# Patient Record
Sex: MALE | Race: WHITE | NOT HISPANIC OR LATINO | Employment: OTHER | URBAN - METROPOLITAN AREA
[De-identification: names, ages, dates, MRNs, and addresses within clinical notes are randomized per-mention and may not be internally consistent; named-entity substitution may affect disease eponyms.]

---

## 2021-02-28 ENCOUNTER — IMMUNIZATIONS (OUTPATIENT)
Dept: FAMILY MEDICINE CLINIC | Facility: HOSPITAL | Age: 81
End: 2021-02-28

## 2021-02-28 DIAGNOSIS — Z23 ENCOUNTER FOR IMMUNIZATION: Primary | ICD-10-CM

## 2021-02-28 PROCEDURE — 0001A SARS-COV-2 / COVID-19 MRNA VACCINE (PFIZER-BIONTECH) 30 MCG: CPT

## 2021-02-28 PROCEDURE — 91300 SARS-COV-2 / COVID-19 MRNA VACCINE (PFIZER-BIONTECH) 30 MCG: CPT

## 2021-03-21 ENCOUNTER — IMMUNIZATIONS (OUTPATIENT)
Dept: FAMILY MEDICINE CLINIC | Facility: HOSPITAL | Age: 81
End: 2021-03-21

## 2021-03-21 DIAGNOSIS — Z23 ENCOUNTER FOR IMMUNIZATION: Primary | ICD-10-CM

## 2021-03-21 PROCEDURE — 91300 SARS-COV-2 / COVID-19 MRNA VACCINE (PFIZER-BIONTECH) 30 MCG: CPT

## 2021-03-21 PROCEDURE — 0002A SARS-COV-2 / COVID-19 MRNA VACCINE (PFIZER-BIONTECH) 30 MCG: CPT

## 2021-09-03 ENCOUNTER — HOSPITAL ENCOUNTER (INPATIENT)
Facility: HOSPITAL | Age: 81
LOS: 5 days | Discharge: HOME HEALTH | DRG: 189 | End: 2021-09-08
Attending: EMERGENCY MEDICINE | Admitting: INTERNAL MEDICINE
Payer: MEDICARE

## 2021-09-03 ENCOUNTER — APPOINTMENT (EMERGENCY)
Dept: CT IMAGING | Facility: HOSPITAL | Age: 81
DRG: 189 | End: 2021-09-03
Payer: MEDICARE

## 2021-09-03 ENCOUNTER — APPOINTMENT (EMERGENCY)
Dept: RADIOLOGY | Facility: HOSPITAL | Age: 81
DRG: 189 | End: 2021-09-03
Payer: MEDICARE

## 2021-09-03 DIAGNOSIS — F03.90 DEMENTIA (HCC): ICD-10-CM

## 2021-09-03 DIAGNOSIS — I71.9 AORTIC ANEURYSM (HCC): Primary | ICD-10-CM

## 2021-09-03 DIAGNOSIS — I48.0 PAROXYSMAL ATRIAL FIBRILLATION (HCC): ICD-10-CM

## 2021-09-03 DIAGNOSIS — T17.908A ASPIRATION INTO AIRWAY: ICD-10-CM

## 2021-09-03 DIAGNOSIS — R53.1 WEAKNESS: ICD-10-CM

## 2021-09-03 PROBLEM — Z95.1 HX OF CABG: Status: ACTIVE | Noted: 2021-09-03

## 2021-09-03 PROBLEM — I48.91 ATRIAL FIBRILLATION (HCC): Status: ACTIVE | Noted: 2021-09-03

## 2021-09-03 PROBLEM — E78.5 HYPERLIPIDEMIA: Status: ACTIVE | Noted: 2021-09-03

## 2021-09-03 PROBLEM — J96.00 ACUTE RESPIRATORY FAILURE (HCC): Status: ACTIVE | Noted: 2021-09-03

## 2021-09-03 PROBLEM — F02.80 ALZHEIMER'S DEMENTIA (HCC): Status: ACTIVE | Noted: 2021-09-03

## 2021-09-03 PROBLEM — E03.9 HYPOTHYROIDISM: Status: ACTIVE | Noted: 2021-09-03

## 2021-09-03 PROBLEM — R06.02 SHORTNESS OF BREATH: Status: ACTIVE | Noted: 2021-09-03

## 2021-09-03 PROBLEM — G30.9 ALZHEIMER'S DEMENTIA (HCC): Status: ACTIVE | Noted: 2021-09-03

## 2021-09-03 PROBLEM — I71.4 AAA (ABDOMINAL AORTIC ANEURYSM) (HCC): Status: ACTIVE | Noted: 2021-09-03

## 2021-09-03 LAB
ALBUMIN SERPL BCP-MCNC: 2.5 G/DL (ref 3.5–5)
ALP SERPL-CCNC: 42 U/L (ref 46–116)
ALT SERPL W P-5'-P-CCNC: 19 U/L (ref 12–78)
ANION GAP SERPL CALCULATED.3IONS-SCNC: 7 MMOL/L (ref 4–13)
APTT PPP: 49 SECONDS (ref 23–37)
AST SERPL W P-5'-P-CCNC: 15 U/L (ref 5–45)
ATRIAL RATE: 88 BPM
BACTERIA UR QL AUTO: NORMAL /HPF
BASOPHILS # BLD AUTO: 0.03 THOUSANDS/ΜL (ref 0–0.1)
BASOPHILS NFR BLD AUTO: 0 % (ref 0–1)
BILIRUB SERPL-MCNC: 0.8 MG/DL (ref 0.2–1)
BILIRUB UR QL STRIP: NEGATIVE
BUN SERPL-MCNC: 15 MG/DL (ref 5–25)
CALCIUM ALBUM COR SERPL-MCNC: 9.1 MG/DL (ref 8.3–10.1)
CALCIUM SERPL-MCNC: 7.9 MG/DL (ref 8.3–10.1)
CHLORIDE SERPL-SCNC: 101 MMOL/L (ref 100–108)
CLARITY UR: CLEAR
CO2 SERPL-SCNC: 30 MMOL/L (ref 21–32)
COLOR UR: YELLOW
CREAT SERPL-MCNC: 1.1 MG/DL (ref 0.6–1.3)
EOSINOPHIL # BLD AUTO: 0.04 THOUSAND/ΜL (ref 0–0.61)
EOSINOPHIL NFR BLD AUTO: 0 % (ref 0–6)
ERYTHROCYTE [DISTWIDTH] IN BLOOD BY AUTOMATED COUNT: 13.6 % (ref 11.6–15.1)
GFR SERPL CREATININE-BSD FRML MDRD: 63 ML/MIN/1.73SQ M
GLUCOSE SERPL-MCNC: 107 MG/DL (ref 65–140)
GLUCOSE UR STRIP-MCNC: NEGATIVE MG/DL
HCT VFR BLD AUTO: 51.2 % (ref 36.5–49.3)
HGB BLD-MCNC: 17.2 G/DL (ref 12–17)
HGB UR QL STRIP.AUTO: ABNORMAL
IMM GRANULOCYTES # BLD AUTO: 0.05 THOUSAND/UL (ref 0–0.2)
IMM GRANULOCYTES NFR BLD AUTO: 1 % (ref 0–2)
INR PPP: 2.32 (ref 0.84–1.19)
KETONES UR STRIP-MCNC: NEGATIVE MG/DL
LACTATE SERPL-SCNC: 1.6 MMOL/L (ref 0.5–2)
LEUKOCYTE ESTERASE UR QL STRIP: NEGATIVE
LYMPHOCYTES # BLD AUTO: 0.77 THOUSANDS/ΜL (ref 0.6–4.47)
LYMPHOCYTES NFR BLD AUTO: 8 % (ref 14–44)
MCH RBC QN AUTO: 33.9 PG (ref 26.8–34.3)
MCHC RBC AUTO-ENTMCNC: 33.6 G/DL (ref 31.4–37.4)
MCV RBC AUTO: 101 FL (ref 82–98)
MONOCYTES # BLD AUTO: 1.28 THOUSAND/ΜL (ref 0.17–1.22)
MONOCYTES NFR BLD AUTO: 13 % (ref 4–12)
MUCOUS THREADS UR QL AUTO: NORMAL
NEUTROPHILS # BLD AUTO: 7.53 THOUSANDS/ΜL (ref 1.85–7.62)
NEUTS SEG NFR BLD AUTO: 78 % (ref 43–75)
NITRITE UR QL STRIP: NEGATIVE
NON-SQ EPI CELLS URNS QL MICRO: NORMAL /HPF
NRBC BLD AUTO-RTO: 0 /100 WBCS
NT-PROBNP SERPL-MCNC: 909 PG/ML
P AXIS: 34 DEGREES
PH UR STRIP.AUTO: 7 [PH]
PLATELET # BLD AUTO: 146 THOUSANDS/UL (ref 149–390)
PMV BLD AUTO: 9.3 FL (ref 8.9–12.7)
POTASSIUM SERPL-SCNC: 3.2 MMOL/L (ref 3.5–5.3)
PR INTERVAL: 126 MS
PROCALCITONIN SERPL-MCNC: 0.06 NG/ML
PROT SERPL-MCNC: 6.1 G/DL (ref 6.4–8.2)
PROT UR STRIP-MCNC: NEGATIVE MG/DL
PROTHROMBIN TIME: 25.3 SECONDS (ref 11.6–14.5)
QRS AXIS: 62 DEGREES
QRSD INTERVAL: 106 MS
QT INTERVAL: 380 MS
QTC INTERVAL: 459 MS
RBC # BLD AUTO: 5.08 MILLION/UL (ref 3.88–5.62)
RBC #/AREA URNS AUTO: NORMAL /HPF
SARS-COV-2 RNA RESP QL NAA+PROBE: NEGATIVE
SODIUM SERPL-SCNC: 138 MMOL/L (ref 136–145)
SP GR UR STRIP.AUTO: <=1.005 (ref 1–1.03)
T WAVE AXIS: 254 DEGREES
TROPONIN I SERPL-MCNC: <0.02 NG/ML
UROBILINOGEN UR QL STRIP.AUTO: 1 E.U./DL
VENTRICULAR RATE: 88 BPM
WBC # BLD AUTO: 9.7 THOUSAND/UL (ref 4.31–10.16)
WBC #/AREA URNS AUTO: NORMAL /HPF
WBC CLUMPS # UR AUTO: PRESENT /UL

## 2021-09-03 PROCEDURE — 99285 EMERGENCY DEPT VISIT HI MDM: CPT

## 2021-09-03 PROCEDURE — 93005 ELECTROCARDIOGRAM TRACING: CPT

## 2021-09-03 PROCEDURE — 36415 COLL VENOUS BLD VENIPUNCTURE: CPT | Performed by: PHYSICIAN ASSISTANT

## 2021-09-03 PROCEDURE — 84484 ASSAY OF TROPONIN QUANT: CPT | Performed by: PHYSICIAN ASSISTANT

## 2021-09-03 PROCEDURE — 99222 1ST HOSP IP/OBS MODERATE 55: CPT | Performed by: PHYSICIAN ASSISTANT

## 2021-09-03 PROCEDURE — 83605 ASSAY OF LACTIC ACID: CPT | Performed by: PHYSICIAN ASSISTANT

## 2021-09-03 PROCEDURE — 87040 BLOOD CULTURE FOR BACTERIA: CPT | Performed by: PHYSICIAN ASSISTANT

## 2021-09-03 PROCEDURE — 80053 COMPREHEN METABOLIC PANEL: CPT | Performed by: PHYSICIAN ASSISTANT

## 2021-09-03 PROCEDURE — 1124F ACP DISCUSS-NO DSCNMKR DOCD: CPT | Performed by: INTERNAL MEDICINE

## 2021-09-03 PROCEDURE — 99285 EMERGENCY DEPT VISIT HI MDM: CPT | Performed by: PHYSICIAN ASSISTANT

## 2021-09-03 PROCEDURE — U0003 INFECTIOUS AGENT DETECTION BY NUCLEIC ACID (DNA OR RNA); SEVERE ACUTE RESPIRATORY SYNDROME CORONAVIRUS 2 (SARS-COV-2) (CORONAVIRUS DISEASE [COVID-19]), AMPLIFIED PROBE TECHNIQUE, MAKING USE OF HIGH THROUGHPUT TECHNOLOGIES AS DESCRIBED BY CMS-2020-01-R: HCPCS | Performed by: PHYSICIAN ASSISTANT

## 2021-09-03 PROCEDURE — 85610 PROTHROMBIN TIME: CPT | Performed by: PHYSICIAN ASSISTANT

## 2021-09-03 PROCEDURE — 71045 X-RAY EXAM CHEST 1 VIEW: CPT

## 2021-09-03 PROCEDURE — 83880 ASSAY OF NATRIURETIC PEPTIDE: CPT | Performed by: PHYSICIAN ASSISTANT

## 2021-09-03 PROCEDURE — 85025 COMPLETE CBC W/AUTO DIFF WBC: CPT | Performed by: PHYSICIAN ASSISTANT

## 2021-09-03 PROCEDURE — 71275 CT ANGIOGRAPHY CHEST: CPT

## 2021-09-03 PROCEDURE — 81001 URINALYSIS AUTO W/SCOPE: CPT | Performed by: PHYSICIAN ASSISTANT

## 2021-09-03 PROCEDURE — 93010 ELECTROCARDIOGRAM REPORT: CPT | Performed by: INTERNAL MEDICINE

## 2021-09-03 PROCEDURE — 94760 N-INVAS EAR/PLS OXIMETRY 1: CPT

## 2021-09-03 PROCEDURE — G1004 CDSM NDSC: HCPCS

## 2021-09-03 PROCEDURE — 84145 PROCALCITONIN (PCT): CPT | Performed by: PHYSICIAN ASSISTANT

## 2021-09-03 PROCEDURE — 99223 1ST HOSP IP/OBS HIGH 75: CPT | Performed by: PHYSICIAN ASSISTANT

## 2021-09-03 PROCEDURE — 94664 DEMO&/EVAL PT USE INHALER: CPT

## 2021-09-03 PROCEDURE — 85730 THROMBOPLASTIN TIME PARTIAL: CPT | Performed by: PHYSICIAN ASSISTANT

## 2021-09-03 PROCEDURE — 74174 CTA ABD&PLVS W/CONTRAST: CPT

## 2021-09-03 PROCEDURE — U0005 INFEC AGEN DETEC AMPLI PROBE: HCPCS | Performed by: PHYSICIAN ASSISTANT

## 2021-09-03 RX ORDER — CHOLECALCIFEROL (VITAMIN D3) 1250 MCG
1 CAPSULE ORAL WEEKLY
COMMUNITY

## 2021-09-03 RX ORDER — LEVOTHYROXINE SODIUM 0.03 MG/1
50 TABLET ORAL DAILY
Status: DISCONTINUED | OUTPATIENT
Start: 2021-09-04 | End: 2021-09-09 | Stop reason: HOSPADM

## 2021-09-03 RX ORDER — LEVOTHYROXINE SODIUM 0.05 MG/1
50 TABLET ORAL DAILY
COMMUNITY

## 2021-09-03 RX ORDER — POTASSIUM CHLORIDE 20MEQ/15ML
40 LIQUID (ML) ORAL ONCE
Status: COMPLETED | OUTPATIENT
Start: 2021-09-03 | End: 2021-09-03

## 2021-09-03 RX ORDER — ACETAMINOPHEN 160 MG/5ML
650 SUSPENSION, ORAL (FINAL DOSE FORM) ORAL ONCE
Status: COMPLETED | OUTPATIENT
Start: 2021-09-03 | End: 2021-09-03

## 2021-09-03 RX ORDER — GUAIFENESIN 600 MG
600 TABLET, EXTENDED RELEASE 12 HR ORAL 2 TIMES DAILY
Status: DISCONTINUED | OUTPATIENT
Start: 2021-09-03 | End: 2021-09-09 | Stop reason: HOSPADM

## 2021-09-03 RX ORDER — PHENOL 1.4 %
10 AEROSOL, SPRAY (ML) MUCOUS MEMBRANE
COMMUNITY

## 2021-09-03 RX ORDER — EZETIMIBE 10 MG/1
10 TABLET ORAL DAILY
Status: DISCONTINUED | OUTPATIENT
Start: 2021-09-04 | End: 2021-09-09 | Stop reason: HOSPADM

## 2021-09-03 RX ORDER — WARFARIN SODIUM 2.5 MG/1
2.5 TABLET ORAL
Status: DISCONTINUED | OUTPATIENT
Start: 2021-09-03 | End: 2021-09-06

## 2021-09-03 RX ORDER — POTASSIUM CHLORIDE 20 MEQ/1
40 TABLET, EXTENDED RELEASE ORAL ONCE
Status: DISCONTINUED | OUTPATIENT
Start: 2021-09-03 | End: 2021-09-03

## 2021-09-03 RX ORDER — DIVALPROEX SODIUM 250 MG/1
500 TABLET, DELAYED RELEASE ORAL
Status: DISCONTINUED | OUTPATIENT
Start: 2021-09-03 | End: 2021-09-09 | Stop reason: HOSPADM

## 2021-09-03 RX ORDER — OMEGA-3S/DHA/EPA/FISH OIL/D3 300MG-1000
400 CAPSULE ORAL DAILY
COMMUNITY
End: 2021-09-03 | Stop reason: CLARIF

## 2021-09-03 RX ORDER — PHENOL 1.4 %
10 AEROSOL, SPRAY (ML) MUCOUS MEMBRANE
Status: DISCONTINUED | OUTPATIENT
Start: 2021-09-03 | End: 2021-09-03

## 2021-09-03 RX ORDER — DIVALPROEX SODIUM 250 MG/1
250 TABLET, DELAYED RELEASE ORAL EVERY MORNING
Status: DISCONTINUED | OUTPATIENT
Start: 2021-09-04 | End: 2021-09-09 | Stop reason: HOSPADM

## 2021-09-03 RX ORDER — EZETIMIBE 10 MG/1
10 TABLET ORAL DAILY
COMMUNITY

## 2021-09-03 RX ORDER — SERTRALINE HYDROCHLORIDE 25 MG/1
25 TABLET, FILM COATED ORAL DAILY
Status: DISCONTINUED | OUTPATIENT
Start: 2021-09-04 | End: 2021-09-09 | Stop reason: HOSPADM

## 2021-09-03 RX ORDER — POTASSIUM CHLORIDE 20 MEQ/1
20 TABLET, EXTENDED RELEASE ORAL 2 TIMES DAILY
Status: DISCONTINUED | OUTPATIENT
Start: 2021-09-03 | End: 2021-09-07

## 2021-09-03 RX ORDER — SERTRALINE HYDROCHLORIDE 25 MG/1
25 TABLET, FILM COATED ORAL DAILY
COMMUNITY

## 2021-09-03 RX ORDER — ATENOLOL AND CHLORTHALIDONE TABLET 50; 25 MG/1; MG/1
1 TABLET ORAL DAILY
COMMUNITY
End: 2021-09-08 | Stop reason: HOSPADM

## 2021-09-03 RX ORDER — CEFTRIAXONE 1 G/50ML
1000 INJECTION, SOLUTION INTRAVENOUS EVERY 24 HOURS
Status: DISCONTINUED | OUTPATIENT
Start: 2021-09-03 | End: 2021-09-09 | Stop reason: HOSPADM

## 2021-09-03 RX ORDER — ALBUTEROL SULFATE 2.5 MG/3ML
2.5 SOLUTION RESPIRATORY (INHALATION) EVERY 4 HOURS PRN
Status: DISCONTINUED | OUTPATIENT
Start: 2021-09-03 | End: 2021-09-09 | Stop reason: HOSPADM

## 2021-09-03 RX ORDER — ACETAMINOPHEN 325 MG/1
650 TABLET ORAL EVERY 6 HOURS PRN
Status: DISCONTINUED | OUTPATIENT
Start: 2021-09-03 | End: 2021-09-09 | Stop reason: HOSPADM

## 2021-09-03 RX ORDER — WARFARIN SODIUM 5 MG/1
TABLET ORAL
COMMUNITY

## 2021-09-03 RX ORDER — DIVALPROEX SODIUM 250 MG/1
250 TABLET, DELAYED RELEASE ORAL EVERY 12 HOURS SCHEDULED
COMMUNITY

## 2021-09-03 RX ADMIN — IOHEXOL 100 ML: 350 INJECTION, SOLUTION INTRAVENOUS at 14:19

## 2021-09-03 RX ADMIN — Medication 10 MG: at 22:04

## 2021-09-03 RX ADMIN — DIVALPROEX SODIUM 500 MG: 250 TABLET, DELAYED RELEASE ORAL at 22:04

## 2021-09-03 RX ADMIN — POTASSIUM CHLORIDE 20 MEQ: 1500 TABLET, EXTENDED RELEASE ORAL at 18:04

## 2021-09-03 RX ADMIN — GUAIFENESIN 600 MG: 600 TABLET ORAL at 18:04

## 2021-09-03 RX ADMIN — ACETAMINOPHEN 650 MG: 325 TABLET, FILM COATED ORAL at 20:11

## 2021-09-03 RX ADMIN — ACETAMINOPHEN 650 MG: 650 SUSPENSION ORAL at 16:41

## 2021-09-03 RX ADMIN — CEFTRIAXONE 1000 MG: 1 INJECTION, SOLUTION INTRAVENOUS at 18:16

## 2021-09-03 RX ADMIN — POTASSIUM CHLORIDE 40 MEQ: 20 SOLUTION ORAL at 14:05

## 2021-09-03 RX ADMIN — WARFARIN SODIUM 2.5 MG: 2.5 TABLET ORAL at 18:04

## 2021-09-03 RX ADMIN — DRONEDARONE 400 MG: 400 TABLET, FILM COATED ORAL at 18:04

## 2021-09-03 NOTE — ASSESSMENT & PLAN NOTE
· Chads 2 Vasc score 2  · Maintained on warfarin 2 5 mg 5 days a week and 5 mg 2 days a week  · Continue Multaq, Tenoretic  · Rate stable in the 80s at time of admission  · Patient also with pacemaker

## 2021-09-03 NOTE — ASSESSMENT & PLAN NOTE
· X2, 1991 and 2001  · Follows with Dr Suad Regalado- Cardiologist  · Pacemaker in place  · Maintained on warfarin

## 2021-09-03 NOTE — ED PROVIDER NOTES
History  Chief Complaint   Patient presents with    Altered Mental Status     per wife pt has increased confusion, lethergy, sob, and weak, pt found by ems to be 85% room air  pt hxn of stroke with right sided deficets  pt has productive cough  Patient is an [de-identified] y/o M with h/o afib on Coumadin, pacer, alzheimer's that was brought in by ambulance for generalized weakness  He was found to be hypoxic on RA  Entire history obtained from wife  She states when she woke patient up this morning he was unable to stand  She states he seems very weak and confused today  He did start with a cough this morning  No fevers  No vomiting or diarrhea  Patient had a normal BM this morning  No blood in stool  History provided by:  Spouse and EMS personnel  History limited by:  Dementia  Altered Mental Status  Presenting symptoms: confusion    Presenting symptoms comment:  Weakness  Most recent episode: Today  Episode history:  Continuous  Timing:  Constant  Progression:  Unchanged  Context: dementia    Associated symptoms: weakness    Associated symptoms: no decreased appetite, no fever, no rash and no vomiting        Prior to Admission Medications   Prescriptions Last Dose Informant Patient Reported? Taking?    Dronedarone HCl (MULTAQ PO)   Yes Yes   Sig: Take by mouth   atenolol-chlorthalidone (TENORETIC) 50-25 mg per tablet   Yes Yes   Sig: Take 1 tablet by mouth daily   cholecalciferol (VITAMIN D3) 400 units tablet   Yes Yes   Sig: Take 400 Units by mouth daily   levothyroxine 50 mcg tablet   Yes Yes   Sig: Take 50 mcg by mouth daily   sertraline (ZOLOFT) 25 mg tablet   Yes Yes   Sig: Take 25 mg by mouth daily   warfarin (COUMADIN) 5 mg tablet   Yes Yes   Sig: Take by mouth daily      Facility-Administered Medications: None       Past Medical History:   Diagnosis Date    [facility]     Atrial fibrillation, chronic ([facility] )        Past Surgical History:   Procedure Laterality Date    APPENDECTOMY  CARDIAC PACEMAKER PLACEMENT      CORONARY ARTERY BYPASS GRAFT      HERNIA REPAIR         No family history on file  I have reviewed and agree with the history as documented  E-Cigarette/Vaping     E-Cigarette/Vaping Substances     Social History     Tobacco Use    Smoking status: Not on file   Substance Use Topics    Alcohol use: Not on file    Drug use: Not on file       Review of Systems   Unable to perform ROS: Dementia   Constitutional: Negative for appetite change, decreased appetite and fever  HENT: Negative for congestion  Respiratory: Positive for cough  Cardiovascular: Positive for leg swelling (chronic)  Gastrointestinal: Negative for blood in stool, diarrhea and vomiting  Skin: Negative for rash  Neurological: Positive for weakness  Psychiatric/Behavioral: Positive for confusion  Physical Exam  Physical Exam  Vitals and nursing note reviewed  Constitutional:       General: He is sleeping  Appearance: Normal appearance  He is well-developed, well-groomed and normal weight  He is ill-appearing  He is not diaphoretic  HENT:      Head: Normocephalic and atraumatic  Nose: Nose normal       Mouth/Throat:      Mouth: Mucous membranes are dry  Eyes:      Comments: Right eye matted shut from history of staph infection after cataract surgery  Cardiovascular:      Rate and Rhythm: Normal rate and regular rhythm  Heart sounds: Normal heart sounds  Pulmonary:      Breath sounds: Decreased breath sounds (due to poor respiratory effort  ) present  Abdominal:      General: Abdomen is flat  Bowel sounds are normal       Palpations: Abdomen is soft  Tenderness: There is no abdominal tenderness  Musculoskeletal:      Cervical back: Normal range of motion  Right lower le+ Pitting Edema present  Left lower le+ Pitting Edema present  Skin:     General: Skin is warm and dry  Coloration: Skin is pale        Findings: No bruising or rash    Neurological:      Mental Status: Mental status is at baseline  He is lethargic  GCS: GCS eye subscore is 3  GCS verbal subscore is 2  GCS motor subscore is 6  Vital Signs  ED Triage Vitals   Temperature Pulse Respirations Blood Pressure SpO2   09/03/21 1245 09/03/21 1135 09/03/21 1135 09/03/21 1245 09/03/21 1135   98 8 °F (37 1 °C) 60 20 105/61 (!) 87 %      Temp Source Heart Rate Source Patient Position - Orthostatic VS BP Location FiO2 (%)   09/03/21 1245 -- -- -- --   Oral          Pain Score       09/03/21 1641       Med Not Given for Pain - for MAR use only           Vitals:    09/03/21 1330 09/03/21 1400 09/03/21 1500 09/03/21 1600   BP: 104/55 100/57 105/51 108/54   Pulse: 61 76 69 77         Visual Acuity  Visual Acuity      Most Recent Value   L Pupil Size (mm)  2   R Pupil Size (mm)  2          ED Medications  Medications   potassium chloride oral solution 40 mEq (40 mEq Oral Given 9/3/21 1405)   iohexol (OMNIPAQUE) 350 MG/ML injection (SINGLE-DOSE) 100 mL (100 mL Intravenous Given 9/3/21 1419)   acetaminophen (TYLENOL) oral suspension 650 mg (650 mg Oral Given 9/3/21 1641)       Diagnostic Studies  Results Reviewed     Procedure Component Value Units Date/Time    UA w Reflex to Microscopic w Reflex to Culture [[id number]]  (Abnormal) Collected: 09/03/21 1518    Lab Status: Final result Specimen: Urine, Straight Cath Updated: 09/03/21 1552     Color, UA Yellow     Clarity, UA Clear     Specific Gravity, UA <=1 005     pH, UA 7 0     Leukocytes, UA Negative     Nitrite, UA Negative     Protein, UA Negative mg/dl      Glucose, UA Negative mg/dl      Ketones, UA Negative mg/dl      Urobilinogen, UA 1 0 E U /dl      Bilirubin, UA Negative     Blood, UA Trace-Intact    Urine Microscopic [[id number]] Collected: 09/03/21 1518    Lab Status:  In process Specimen: Urine, Straight Cath Updated: 09/03/21 1551    Novel Coronavirus (Covid-19),PCR [facility] - 2 Hour Stat [[id number]]  (Normal) Collected: 09/03/21 1237    Lab Status: Final result Specimen: Nares from Nose Updated: 09/03/21 1346     SARS-CoV-2 Negative    Narrative: The specimen collection materials, transport medium, and/or testing methodology utilized in the production of these test results have been proven to be reliable in a limited validation with an abbreviated program under the Emergency Utilization Authorization provided by the FDA  Testing reported as "Presumptive positive" will be confirmed with secondary testing to ensure result accuracy  Clinical caution and judgement should be used with the interpretation of these results with consideration of the clinical impression and other laboratory testing  Testing reported as "Positive" or "Negative" has been proven to be accurate according to standard laboratory validation requirements  All testing is performed with control materials showing appropriate reactivity at standard intervals        Comprehensive metabolic panel [[id number]]  (Abnormal) Collected: 09/03/21 1237    Lab Status: Final result Specimen: Blood from Arm, Right Updated: 09/03/21 1317     Sodium 138 mmol/L      Potassium 3 2 mmol/L      Chloride 101 mmol/L      CO2 30 mmol/L      ANION GAP 7 mmol/L      BUN 15 mg/dL      Creatinine 1 10 mg/dL      Glucose 107 mg/dL      Calcium 7 9 mg/dL      Corrected Calcium 9 1 mg/dL      AST 15 U/L      ALT 19 U/L      Alkaline Phosphatase 42 U/L      Total Protein 6 1 g/dL      Albumin 2 5 g/dL      Total Bilirubin 0 80 mg/dL      eGFR 63 ml/min/1 73sq m     Narrative:      [place] guidelines for Chronic Kidney Disease (CKD):     Stage 1 with normal or high GFR (GFR > 90 mL/min/1 73 square meters)    Stage 2 Mild CKD (GFR = 60-89 mL/min/1 73 square meters)    Stage 3A Moderate CKD (GFR = 45-59 mL/min/1 73 square meters)    Stage 3B Moderate CKD (GFR = 30-44 mL/min/1 73 square meters)    Stage 4 Severe CKD (GFR = 15-29 mL/min/1 73 square meters)   Stage 5 End Stage CKD (GFR <15 mL/min/1 73 square meters)  Note: GFR calculation is accurate only with a steady state creatinine    Troponin I [[id number]]  (Normal) Collected: 09/03/21 1237    Lab Status: Final result Specimen: Blood from Arm, Right Updated: 09/03/21 1310     Troponin I <0 02 ng/mL     Lactic acid [[id number]]  (Normal) Collected: 09/03/21 1237    Lab Status: Final result Specimen: Blood from Arm, Right Updated: 09/03/21 1306     LACTIC ACID 1 6 mmol/L     Narrative:      Result may be elevated if tourniquet was used during collection  Protime-INR [[id number]]  (Abnormal) Collected: 09/03/21 1237    Lab Status: Final result Specimen: Blood from Arm, Right Updated: 09/03/21 1305     Protime 25 3 seconds      INR 2 32    APTT [[id number]]  (Abnormal) Collected: 09/03/21 1237    Lab Status: Final result Specimen: Blood from Arm, Right Updated: 09/03/21 1305     PTT 49 seconds     CBC and differential [[id number]]  (Abnormal) Collected: 09/03/21 1237    Lab Status: Final result Specimen: Blood from Arm, Right Updated: 09/03/21 1250     WBC 9 70 Thousand/uL      RBC 5 08 Million/uL      Hemoglobin 17 2 g/dL      Hematocrit 51 2 %       fL      MCH 33 9 pg      MCHC 33 6 g/dL      RDW 13 6 %      MPV 9 3 fL      Platelets 852 Thousands/uL      nRBC 0 /100 WBCs      Neutrophils Relative 78 %      Immat GRANS % 1 %      Lymphocytes Relative 8 %      Monocytes Relative 13 %      Eosinophils Relative 0 %      Basophils Relative 0 %      Neutrophils Absolute 7 53 Thousands/µL      Immature Grans Absolute 0 05 Thousand/uL      Lymphocytes Absolute 0 77 Thousands/µL      Monocytes Absolute 1 28 Thousand/µL      Eosinophils Absolute 0 04 Thousand/µL      Basophils Absolute 0 03 Thousands/µL     Blood culture #1 [[id number]] Collected: 09/03/21 1237    Lab Status:  In process Specimen: Blood from Arm, Right Updated: 09/03/21 1247    Procalcitonin with AM Reflex [[id number]] Collected: 09/03/21 1237    Lab Status: In process Specimen: Blood from Arm, Right Updated: 09/03/21 1247    Blood culture #2 [[id number]] Collected: 09/03/21 1237    Lab Status: In process Specimen: Blood from Arm, Left Updated: 09/03/21 1247                 CTA dissection protocol chest abdomen pelvis w wo contrast   Final Result by Emelia Mcdowell MD (09/03 1516)   Addendum 1 of 1 by Emelia Mcdowell MD (09/03 1516)   ADDENDUM:      This addendum corrects a dictation error within the body  Under VASCULAR    STRUCTURES, there IS evidence of a dissection flap in the aneurysmal    portion of the aorta  Final      1   3 4 cm saccular infrarenal abdominal aortic aneurysm, just above the iliac bifurcation, with focal dissection flap   2  Debris within the right lower lobe airways, concerning for aspiration  Given its focality, recommend follow-up unenhanced chest CT in one month to ensure resolution and exclude endobronchial neoplasm   3   4 3 cm ascending aortic aneurysm   4  Moderate rectal stool burden with focal wall thickening, may represent a mild colitis                Workstation performed: [id number]         XR chest portable   Final Result by Carver Claude, MD (09/03 1430)      No active pulmonary disease on examination which is somewhat limited secondary to low lung volumes  Workstation performed: [id number]                    Procedures  ECG 12 Lead Documentation Only    Date/Time: 9/3/2021 12:45 PM  Performed by: Krystin Foreman PA-C  Authorized by: Krystin Foreman PA-C     Indications / Diagnosis:  Weakness  Patient location:  ED  Previous ECG:     Previous ECG:  Unavailable  Rate:     ECG rate:  88  Rhythm:     Rhythm: paced               ED Course  ED Course as of Sep 03 1648   Fri Sep 03, 2021   1523 Discussed CT results with wife  She states she would like to discuss surgery with her family before making a decision  1544 Vascular paged concerning CT results         [address] from vascular d/w vascular surgeon  [id number] Vascular does not feel infrarenal aneurysm requires surgery, will admit here for aspiration  Initial Sepsis Screening     Row Name 09/03/21 6532                Is the patient's history suggestive of a new or worsening infection? (!) Yes (Proceed)  -CD        Suspected source of infection  suspect infection, source unknown  -CD        Are two or more of the following signs & symptoms of infection both present and new to the patient? No  -CD        Indicate SIRS criteria  Tachypnea > 20 resp per min  -CD        If the answer is yes to both questions, suspicion of sepsis is present  --        If severe sepsis is present AND tissue hypoperfusion perists in the hour after fluid resuscitation or lactate > 4, the patient meets criteria for SEPTIC SHOCK  --        Are any of the following organ dysfunction criteria present within 6 hours of suspected infection and SIRS criteria that are NOT considered to be chronic conditions?   --        Organ dysfunction  --        Date of presentation of severe sepsis  --        Time of presentation of severe sepsis  --        Tissue hypoperfusion persists in the hour after crystalloid fluid administration, evidenced, by either:  --        Was hypotension present within one hour of the conclusion of crystalloid fluid administration?  --        Date of presentation of septic shock  --        Time of presentation of septic shock  --          User Key  (r) = Recorded By, (t) = Taken By, (c) = Cosigned By    [address] Name Provider Type    CD Melo Cano PA-C Physician Assistant                        MDM  Number of Diagnoses or Management Options  Aortic aneurysm [facility]): new and requires workup  Aspiration into airway: new and requires workup  Dementia [facility]): established and worsening  Weakness: new and requires workup  Diagnosis management comments: Patient with weakness, cough, will order labs, CXR to r/o pneumonia  Patient with wide mediastinum, will order CTA to r/o dissection  Patient does have aortic anuerysm with flap, d/w vascular surgeon who feels this is not surgical and is stable, will admit to [facility] for aspiration  Amount and/or Complexity of Data Reviewed  Clinical lab tests: ordered and reviewed  Tests in the radiology section of CPT®: ordered and reviewed  Obtain history from someone other than the patient: yes  Discuss the patient with other providers: yes    Patient Progress  Patient progress: stable      Disposition  Final diagnoses: Aortic aneurysm (HCC) - infrarenal with dissection flap and ascending aorta  Aspiration into airway   Dementia (HCC)   Weakness     Time reflects when diagnosis was documented in both MDM as applicable and the Disposition within this note     Time User Action Codes Description Comment    9/3/2021  4:06 PM Marlise Foley Add [I71 9] Aortic aneurysm ([facility] )     9/3/2021  4:18 PM Marlise Foley Modify [I71 9] Aortic aneurysm ([facility] ) infrarenal with dissection flap and ascending aorta  9/3/2021  4:18 PM Marlise Foley Add [J50 730H] Aspiration into airway     9/3/2021  4:18 PM Marlise Foley Add [F03 90] Dementia ([facility] )     9/3/2021  4:18 PM Marlise Foley Add [R53 1] Weakness       ED Disposition     ED Disposition Condition Date/Time Comment    Admit Stable Fri Sep 3, 2021  4:18 PM Case was discussed with DR Brandee Haile and the patient's admission status was agreed to be Admission Status: inpatient status to the service of Dr Brandee Haile   Follow-up Information    None         Patient's Medications   Discharge Prescriptions    No medications on file     No discharge procedures on file      PDMP Review     None          ED Provider  Electronically Signed by           Rah Varela PA-C  09/03/21 4747

## 2021-09-03 NOTE — ASSESSMENT & PLAN NOTE
· Undergoing workup for aspiration pneumonia, currently on IV antibiotics  · Currently on 2 L at time of admission  · Follow-up on procalcitonin and BNP, adjust medications accordingly  · CTA does have poor aspiration pneumonia  · Speech therapy to see patient

## 2021-09-03 NOTE — ASSESSMENT & PLAN NOTE
· Suspected aspiration pneumonia vs CHF  · Initial troponin negative- EKG reveals NSR  · Requiring 2 L nasal cannula at time of admission  · Suspected aspiration pneumonia versus CHF exacerbation  · White denies history of CHF, no echocardiogram within the last 2 years  · Echocardiogram ordered however, if symptomatically improved over the weekend, patient's wife would like him to return home and have echo done with the primary cardiologist as we will not have access to echo over the holiday weekend  · COVID-19 pending  · Afebrile without WBC elevation, given reported difficulty swallowing, speech study ordered  · CTA suggests possible aspiration pneumonia, will treat IV Rocephin (anaerobic coverage no longer indicated)  · Follow-up blood culture results and adjust antibiotics accordingly  · Procalcitonin pending, if negative x2 suspect that shortness of breath is more likely CHF  · Patient does appear to have lower extremity edema bilaterally and was previously on "a different water pill" per wife    No recent echocardiogram  · BNP pending  · Follow-up on echocardiogram if able to be performed this admission and trial Lasix if workup for aspiration pneumonia is negative

## 2021-09-03 NOTE — ASSESSMENT & PLAN NOTE
· Noted on CTA:  4 3 cm ascending aortic aneurysm and 3 2 cm x 3 4 cm saccular infrarenal abdominal aortic aneurysm with focal dissection flap    · Continue to maintain blood pressure control, patient on atenolol  · Outpatient follow-up  · Vascular surgery had determined patient is not a candidate for surgery, there is no acute intervention to be done

## 2021-09-03 NOTE — PLAN OF CARE
Problem: Potential for Falls  Goal: Patient will remain free of falls  Description: INTERVENTIONS:  - Educate patient/family on patient safety including physical limitations  - Instruct patient to call for assistance with activity   - Consult OT/PT to assist with strengthening/mobility   - Keep Call bell within reach  - Keep bed low and locked with side rails adjusted as appropriate  - Keep care items and personal belongings within reach  - Initiate and maintain comfort rounds  - Make Fall Risk Sign visible to staff  - Offer Toileting every 2 Hours, in advance of need  - Initiate/Maintain bed alarm  - Obtain necessary fall risk management equipment:   - Apply yellow socks and bracelet for high fall risk patients  - Consider moving patient to room near nurses station  Outcome: Progressing     Problem: MOBILITY - ADULT  Goal: Maintain or return to baseline ADL function  Description: INTERVENTIONS:  -  Assess patient's ability to carry out ADLs; assess patient's baseline for ADL function and identify physical deficits which impact ability to perform ADLs (bathing, care of mouth/teeth, toileting, grooming, dressing, etc )  - Assess/evaluate cause of self-care deficits   - Assess range of motion  - Assess patient's mobility; develop plan if impaired  - Assess patient's need for assistive devices and provide as appropriate  - Encourage maximum independence but intervene and supervise when necessary  - Involve family in performance of ADLs  - Assess for home care needs following discharge   - Consider OT consult to assist with ADL evaluation and planning for discharge  - Provide patient education as appropriate  Outcome: Progressing  Goal: Maintains/Returns to pre admission functional level  Description: INTERVENTIONS:  - Perform BMAT or MOVE assessment daily    - Set and communicate daily mobility goal to care team and patient/family/caregiver     - Collaborate with rehabilitation services on mobility goals if consulted  - Perform Range of Motion 2 times a day  - Reposition patient every 2 hours    - Dangle patient 2 times a day  - Stand patient 2 times a day  - Ambulate patient 2 times a day  - Out of bed to chair 2 times a day   - Out of bed for meals 2 times a day  - Out of bed for toileting  - Record patient progress and toleration of activity level   Outcome: Progressing     Problem: PAIN - ADULT  Goal: Verbalizes/displays adequate comfort level or baseline comfort level  Description: Interventions:  - Encourage patient to monitor pain and request assistance  - Assess pain using appropriate pain scale  - Administer analgesics based on type and severity of pain and evaluate response  - Implement non-pharmacological measures as appropriate and evaluate response  - Consider cultural and social influences on pain and pain management  - Notify physician/advanced practitioner if interventions unsuccessful or patient reports new pain  Outcome: Progressing     Problem: SAFETY ADULT  Goal: Patient will remain free of falls  Description: INTERVENTIONS:  - Educate patient/family on patient safety including physical limitations  - Instruct patient to call for assistance with activity   - Consult OT/PT to assist with strengthening/mobility   - Keep Call bell within reach  - Keep bed low and locked with side rails adjusted as appropriate  - Keep care items and personal belongings within reach  - Initiate and maintain comfort rounds  - Make Fall Risk Sign visible to staff  - Offer Toileting every 2 Hours, in advance of need  - Initiate/Maintain bed alarm  - Obtain necessary fall risk management equipment:   - Apply yellow socks and bracelet for high fall risk patients  - Consider moving patient to room near nurses station  Outcome: Progressing     Problem: DISCHARGE PLANNING  Goal: Discharge to home or other facility with appropriate resources  Description: INTERVENTIONS:  - Identify barriers to discharge w/patient and caregiver  - Arrange for needed discharge resources and transportation as appropriate  - Identify discharge learning needs (meds, wound care, etc )  - Arrange for interpretive services to assist at discharge as needed  - Refer to Case Management Department for coordinating discharge planning if the patient needs post-hospital services based on physician/advanced practitioner order or complex needs related to functional status, cognitive ability, or social support system  Outcome: Progressing     Problem: Knowledge Deficit  Goal: Patient/family/caregiver demonstrates understanding of disease process, treatment plan, medications, and discharge instructions  Description: Complete learning assessment and assess knowledge base    Interventions:  - Provide teaching at level of understanding  - Provide teaching via preferred learning methods  Outcome: Progressing     Problem: CARDIOVASCULAR - ADULT  Goal: Maintains optimal cardiac output and hemodynamic stability  Description: INTERVENTIONS:  - Monitor I/O, vital signs and rhythm  - Monitor for S/S and trends of decreased cardiac output  - Administer and titrate ordered vasoactive medications to optimize hemodynamic stability  - Assess quality of pulses, skin color and temperature  - Assess for signs of decreased coronary artery perfusion  - Instruct patient to report change in severity of symptoms  Outcome: Progressing     Problem: RESPIRATORY - ADULT  Goal: Achieves optimal ventilation and oxygenation  Description: INTERVENTIONS:  - Assess for changes in respiratory status  - Assess for changes in mentation and behavior  - Position to facilitate oxygenation and minimize respiratory effort  - Oxygen administered by appropriate delivery if ordered  - Initiate smoking cessation education as indicated  - Encourage broncho-pulmonary hygiene including cough, deep breathe, Incentive Spirometry  - Assess the need for suctioning and aspirate as needed  - Assess and instruct to report SOB or any respiratory difficulty  - Respiratory Therapy support as indicated  Outcome: Progressing

## 2021-09-03 NOTE — ASSESSMENT & PLAN NOTE
· Wife is primary caregiver, at baseline, he is able to recognize her and knows his name  At time of admission he does not know his name and wife reports this is new from morning of admission    · May be due to acute hypoxia vs infection  · Optimize sleep-wake cycles  · Maintained on melatonin, sertraline  · If wife is not at bedside, patient will wander  · Wife reporting progressive weakness, difficulty swallowing, will have patient seen by PT/OT and speech therapy

## 2021-09-03 NOTE — QUICK NOTE
QUICK NOTE - Deterioration Index  Deirdre Calvert [de-identified] y o  male MRN: [id number]  Unit/Bed#: -01 Encounter: [id number]      Time Paged: 1933  Room #: 36  Primary RN: Mell Aguilar  Arrival Time: 1942  Deterioration index score at time of page: 66 2  19% Age [de-identified]   18% Ting coma scale 13   17% Supplemental oxygen Nasal cannula   16% Respiratory rate 24   14% Neurological exam New agitation   6% Cardiac rhythm Sinus bradycardia   4% Systolic 160   3% WBC count 9 7 Thousand/uL           PROBLEMS:    Acute hypoxic respiratory failure, asp pna vs CHF   Toxic metabolic encephalopathy w/ underlying alzheimers dementia  Con depakote/zoloft/melatonin   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   Chronic afib    PLAN:     Neuro: neuro checks, oral diet changed for concern of aspiration   CV: No surgical intervention per vascular  Maintain SBP,ideally <140  Outpatient vascular f/u  Coumadin/tenoretic/multaq   Pulm: Titrate O2 for SpO2 >92%  Currently 3L  Ceftriaxone, f/u procal  T/c strep/legionella/sputum culture, optimize pulm toilet   GI: diet modified 2/2 concern aspiration  f/u formal speech eval     : Stable  Monitor UO/Creatinie   FEN: K repleted; trend and optimize prn   ID: trend wbc/temp/cultures  Trend procal   Heme: stable   Other: Full code  May need re-visitation dependent on patients trajectory  HPI Statement (Background):   Deirdre Calvert is a [de-identified]y o  year old male h/o alzheimers, CABG, chronic afib admitted today for acute hypoxic respiratory failure/encephalopathy felt to be 2/2 aspiration pneumonia +/- CHF  DI alert called for GCS 13 in setting of advanced age (see above)  Patient HD stable  No change in MS since admission and suspected 2/2 illness  No reported focal deficits  Spoke w/ nurse  Advised to notify primary team of myself of any concerns  At this point no further suggestions or need for upgrade  Available, if needed       Historical Information   Past Medical History: Diagnosis Date    [facility] dementia [facility] [facility] )     Atrial fibrillation, chronic ([facility] [facility] )      Past Surgical History:   Procedure Laterality Date    APPENDECTOMY      CARDIAC PACEMAKER PLACEMENT      CORONARY ARTERY BYPASS GRAFT      HERNIA REPAIR         Vitals:   Vitals:    21 1400 21 1500 21 1600 21 1805   BP: 100/57 105/51 108/54 101/54   Pulse: 76 69 77 76   Resp: (!) 24 18 22 (!) 24   Temp:    100 1 °F (37 8 °C)   TempSrc:       SpO2: 95% 92% 95%    Weight:       Height:           Temperature: Temp (24hrs), Av 5 °F (37 5 °C), Min:98 8 °F (37 1 °C), Max:100 1 °F (37 8 °C)  Current: Temperature: 100 1 °F (37 8 °C)    DIAGNOSTIC DATA:    Labs:   Results from last 7 days   Lab Units 21  1237   WBC Thousand/uL 9 70   HEMOGLOBIN g/dL 17 2*   HEMATOCRIT % 51 2*   PLATELETS Thousands/uL 146*   NEUTROS PCT % 78*   MONOS PCT % 13*     Results from last 7 days   Lab Units 21  1237   SODIUM mmol/L 138   POTASSIUM mmol/L 3 2*   CHLORIDE mmol/L 101   CO2 mmol/L 30   BUN mg/dL 15   CREATININE mg/dL 1 10   CALCIUM mg/dL 7 9*   ALK PHOS U/L 42*   ALT U/L 19   AST U/L 15              Results from last 7 days   Lab Units 21  1237   INR  2 32*   PTT seconds 49*     Results from last 7 days   Lab Units 21  1237   LACTIC ACID mmol/L 1 6     Results from last 7 days   Lab Units 21  1237   TROPONIN I ng/mL <0 02             No results found for: [facility]             Applicable Imaging Studies: reviewed       Code Status: Level 1 - Full Code

## 2021-09-03 NOTE — CONSULTS
Consultation - Vascular Surgery   Son Cook [de-identified] y o  male MRN: [id number]  Unit/Bed#: ED 01 Encounter: [id number]      Assessment/Plan      Assessment:   71-year-old with hypoxia,  Mental status changes, underlying dementia with CTA findings  Of a 3 4 cm saccular infrarenal abdominal aortic aneurysm just above the iliac bifurcation with focal dissection flap    Plan:   no immediate surgical intervention needed for this   outpatient follow-up   maintain good blood pressure control       History of Present Illness   Physician Requesting Consult: Chelsey Soto DO;Caitlin*  Reason for Consult / Principal Problem: CTA findings  History, ROS and PFSH unobtainable from any source due to Alzheimer dementia  HPI: Son Cook is a [de-identified]y o  year old male who presents with  Generalized weakness, mental status changes and hypoxia  Patient with significant past medical history  Atrial fib on Coumadin with pacemaker in place, history of stroke with right-sided defects and coronary artery disease status post bypass surgery  Patient is unable to provide a meaningful history due to underlying comorbidities and Alzheimer's dementia    Per wife he is progressively gotten week more week, shortness of breath and seems to have increasing  Decline in his mental status    Inpatient consult to Vascular Surgery  Consult performed by: Sheridan Heck PA-C  Consult ordered by: Irena Burciaga PA-C          Review of Systems   Unable to perform ROS: Dementia       Historical Information   Past Medical History:   Diagnosis Date    Alzheimer's dementia [facility])     Atrial fibrillation, chronic ([facility] [address] )      Past Surgical History:   Procedure Laterality Date    APPENDECTOMY      CARDIAC PACEMAKER PLACEMENT      CORONARY ARTERY BYPASS GRAFT      HERNIA REPAIR       Social History   Social History     Substance and Sexual Activity   Alcohol Use Not on file     Social History     Substance and Sexual Activity   Drug Use Not on file E-Cigarette/Vaping     E-Cigarette/Vaping Substances     Social History     Tobacco Use   Smoking Status Not on file     Family History: non-contributory}    Meds/Allergies   all current active meds have been reviewed  Allergies   Allergen Reactions    Azithromycin Dizziness    Zocor [Simvastatin] Rash       Objective   Vitals: Blood pressure 105/51, pulse 69, temperature 98 8 °F (37 1 °C), temperature source Oral, resp  rate 18, height 5' 6" (1 676 m), weight 73 9 kg (163 lb), SpO2 92 %  ,Body mass index is 26 31 kg/m²  No intake or output data in the 24 hours ending 09/03/21 1619  Invasive Devices     Peripheral Intravenous Line            Peripheral IV 09/03/21 Left Antecubital <1 day                Physical Exam  Constitutional:       Appearance: He is ill-appearing  HENT:      Head: Normocephalic  Eyes:      Conjunctiva/sclera: Conjunctivae normal    Cardiovascular:      Rate and Rhythm: Normal rate and regular rhythm  Pulses:           Carotid pulses are 2+ on the right side and 2+ on the left side  Radial pulses are 2+ on the right side and 2+ on the left side  Femoral pulses are 2+ on the right side and 2+ on the left side  Popliteal pulses are 2+ on the right side and 2+ on the left side  Dorsalis pedis pulses are 1+ on the right side and 1+ on the left side  Posterior tibial pulses are 1+ on the right side and 1+ on the left side  Pulmonary:      Breath sounds: Wheezing, rhonchi and rales present  Abdominal:      General: Bowel sounds are decreased  There is distension (slight)  Palpations: Abdomen is soft  There is no mass  Tenderness: There is no guarding or rebound  Musculoskeletal:      Right lower leg: Edema present  Left lower leg: Edema present  Skin:     General: Skin is warm  Coloration: Skin is pale  Neurological:      Mental Status: He is disoriented           Lab Results:   Coags:   Lab Results   Component Value Date PTT 49 (H) 09/03/2021    INR 2 32 (H) 09/03/2021   , CBC with diff:   Lab Results   Component Value Date    WBC 9 70 09/03/2021    HGB 17 2 (H) 09/03/2021    HCT 51 2 (H) 09/03/2021     (H) 09/03/2021     (L) 09/03/2021    MCH 33 9 09/03/2021    MCHC 33 6 09/03/2021    RDW 13 6 09/03/2021    MPV 9 3 09/03/2021    NRBC 0 09/03/2021   , BMP/CMP:   Lab Results   Component Value Date    SODIUM 138 09/03/2021    K 3 2 (L) 09/03/2021     09/03/2021    CO2 30 09/03/2021    BUN 15 09/03/2021    CREATININE 1 10 09/03/2021    CALCIUM 7 9 (L) 09/03/2021    AST 15 09/03/2021    ALT 19 09/03/2021    ALKPHOS 42 (L) 09/03/2021    EGFR 63 09/03/2021   , Blood Culture: No results found for: BLOODCX, Urine Culture: No results found for: URINECX  Imaging Studies: I have personally reviewed pertinent reports  EKG, Pathology, and Other Studies: I have personally reviewed pertinent reports      VTE Prophylaxis: Warfarin (Coumadin)     Code Status: No Order  Advance Directive and Living Will:      Power of :    POLST:      Counseling / Coordination of Care  None

## 2021-09-03 NOTE — RESPIRATORY THERAPY NOTE
RT Protocol Note  Lida Gracia [de-identified] y o  male MRN: [id number]  Unit/Bed#: -01 Encounter: [id number]    Assessment    Principal Problem:    Shortness of breath  Active Problems:    Atrial fibrillation (HCC)    Hx of CABG    Alzheimer's dementia (HCC)    AAA (abdominal aortic aneurysm) (HCC)    Hypothyroidism    Hyperlipidemia    Acute respiratory failure ([facility] [address] )      Home Pulmonary Medications:  none       Past Medical History:   Diagnosis Date    [facility]     Atrial fibrillation, chronic (HCC)      Social History     Socioeconomic History    Marital status: /Civil Union     Spouse name: Not on file    Number of children: Not on file    Years of education: Not on file    Highest education level: Not on file   Occupational History    Not on file   Tobacco Use    Smoking status: Not on file   Substance and Sexual Activity    Alcohol use: Not on file    Drug use: Not on file    Sexual activity: Not on file   Other Topics Concern    Not on file   Social History Narrative    Not on file     Social Determinants of Health     Financial Resource Strain:     Difficulty of Paying Living Expenses:    Food Insecurity:     Worried About Running Out of Food in the Last Year:     Ahsan of Food in the Last Year:    Transportation Needs:     Lack of Transportation (Medical):      Lack of Transportation (Non-Medical):    Physical Activity:     Days of Exercise per Week:     Minutes of Exercise per Session:    Stress:     Feeling of Stress :    Social Connections:     Frequency of Communication with Friends and Family:     Frequency of Social Gatherings with Friends and Family:     Attends Sabianist Services:     Active Member of Clubs or Organizations:     Attends Club or Organization Meetings:     Marital Status:    Intimate Partner Violence:     Fear of Current or Ex-Partner:     Emotionally Abused:     Physically Abused:     Sexually Abused:        Subjective Objective    Physical Exam:   Assessment Type: Assess only  General Appearance: Awake  Respiratory Pattern: Normal  Chest Assessment: Chest expansion symmetrical  Bilateral Breath Sounds: Clear, Diminished  Cough: Non-productive, Dry  O2 Device: (P) NC    Vitals:  Blood pressure 108/54, pulse 77, temperature 98 8 °F (37 1 °C), temperature source Oral, resp  rate 22, height 5' 6" (1 676 m), weight 73 9 kg (163 lb), SpO2 95 %            Imaging and other studies: I have personally reviewed pertinent films in PACS    O2 Device: (P) NC     Plan    Respiratory Plan: No distress/Pulmonary history        Resp Comments: as per protocol neb tx's ordered prn at this time

## 2021-09-03 NOTE — H&P
Nolan Juares  H&P- Nikhil Jara 1940, [de-identified] y o  male MRN: [id number]  Unit/Bed#: -01 Encounter: [id number]  Primary Care Provider: Deepthi Rodas MD   Date and time admitted to hospital: 9/3/2021 11:30 AM    * Shortness of breath  Assessment & Plan  · Suspected aspiration pneumonia vs CHF  · Initial troponin negative- EKG reveals NSR  · Requiring 2 L nasal cannula at time of admission  · Suspected aspiration pneumonia versus CHF exacerbation  · White denies history of CHF, no echocardiogram within the last 2 years  · Echocardiogram ordered however, if symptomatically improved over the weekend, patient's wife would like him to return home and have echo done with the primary cardiologist as we will not have access to echo over the holiday weekend  · COVID-19 pending  · Afebrile without WBC elevation, given reported difficulty swallowing, speech study ordered  · CTA suggests possible aspiration pneumonia, will treat IV Rocephin (anaerobic coverage no longer indicated)  · Follow-up blood culture results and adjust antibiotics accordingly  · Procalcitonin pending, if negative x2 suspect that shortness of breath is more likely CHF  · Patient does appear to have lower extremity edema bilaterally and was previously on "a different water pill" per wife    No recent echocardiogram  · BNP pending  · Follow-up on echocardiogram if able to be performed this admission and trial Lasix if workup for aspiration pneumonia is negative    Acute respiratory failure (HCC)  Assessment & Plan  · Undergoing workup for aspiration pneumonia, currently on IV antibiotics  · Currently on 2 L at time of admission  · Follow-up on procalcitonin and BNP, adjust medications accordingly  · CTA does have poor aspiration pneumonia  · Speech therapy to see patient    Hyperlipidemia  Assessment & Plan  · Maintained on ezetimibe  · Continue statin therapy    Hypothyroidism  Assessment & Plan  · Continue levothyroxine  · Outpatient monitoring of TSH    AAA (abdominal aortic aneurysm) (HCC)  Assessment & Plan  · Noted on CTA:  4 3 cm ascending aortic aneurysm and 3 2 cm x 3 4 cm saccular infrarenal abdominal aortic aneurysm with focal dissection flap  · Continue to maintain blood pressure control, patient on atenolol  · Outpatient follow-up  · Vascular surgery had determined patient is not a candidate for surgery, there is no acute intervention to be done    Alzheimer's dementia [facility])  Assessment & Plan  · Wife is primary caregiver, at baseline, he is able to recognize her and knows his name  At time of admission he does not know his name and wife reports this is new from morning of admission  · May be due to acute hypoxia vs infection  · Optimize sleep-wake cycles  · Maintained on melatonin, sertraline  · If wife is not at bedside, patient will wander  · Wife reporting progressive weakness, difficulty swallowing, will have patient seen by PT/OT and speech therapy    Hx of CABG  Assessment & Plan  · X2, 1991 and 2001  · Follows with Dr Sabrina Blackwell- Cardiologist  · Pacemaker in place  · Maintained on warfarin    Atrial fibrillation ([facility] [address] )  Assessment & Plan  · Chads 2 Vasc score 2  · Maintained on warfarin 2 5 mg 5 days a week and 5 mg 2 days a week  · Continue Multaq, Tenoretic  · Rate stable in the 80s at time of admission  · Patient also with pacemaker    VTE Pharmacologic Prophylaxis: VTE Score: 6 High Risk (Score >/= 5) - Pharmacological DVT Prophylaxis Ordered: warfarin (Coumadin)  Sequential Compression Devices Ordered  Code Status: No Order   Discussion with family: Updated  (wife and son) at bedside  Anticipated Length of Stay: Patient will be admitted on an inpatient basis with an anticipated length of stay of greater than 2 midnights secondary to SOB      Total Time for Visit, including Counseling / Coordination of Care: 60 minutes Greater than 50% of this total time spent on direct patient counseling and coordination of care  Chief Complaint: SOB, weakness    History of Present Illness:  Rommel Malone is a [de-identified] y o  male with a PMH of CABG x2, atrial fibrillation on warfarin, hyperlipidemia, hypertension, Alzheimer's dementia, hypothyroidism who presents with weakness, worsening cognitive status x1 day  Wife reports that when she was assisting patient with ambulation this morning he was weaker, more relating continued to want to sit down  At baseline he does know his own name and can recognize her but today is unable to state his own name  She is his primary caregiver and is very attentive  She is at his side at all times otherwise patient does become confused and begins to wander  Emergency department, imaging did reveal AAA  Vascular surgery was consulted and are recommending no surgical intervention at present  Suspect the underlying source of hypoxia is due to aspiration pneumonia  Patient does have some difficulty swallowing per wife  She reports she has tried to alter his diet but he has not been formally evaluated  Will have speech therapy see the patient here and have bedside nursing clear patient to continue with a cardiac diet until they are able to see him  Also, patient lower extremity edema  Wife reports he was previously on "a different water pill" he does follow with out of network Cardiology  He has not had an echocardiogram in 2 years  Will order  Wife says that if patient is medically cleared for discharge over the weekend she would not want to wait for echocardiogram and instead will follow up with primary outpatient cardiologist   He does have some lower extremity edema on admission  Will follow up on BNP, procalcitonin  If suggestions source of shortness of breath is aspiration pneumonia will not alter any diuretics however, if BNP returns elevated and patient procalcitonin is negative, would treat with diuresis    Wife reports that he does have lower [place]   Occupation:  Retired  Patient Pre-hospital Living Situation: Home  Patient Pre-hospital Level of Mobility: walks with person assist  Patient Pre-hospital Diet Restrictions:  Soft foods  Substance Use History:   Social History     Substance and Sexual Activity   Alcohol Use Not on file     Social History     Tobacco Use   Smoking Status Not on file     Social History     Substance and Sexual Activity   Drug Use Not on file       Family History:  No family history on file  Physical Exam:     Vitals:   Blood Pressure: 108/54 (21 1600)  Pulse: 77 (21 1600)  Temperature: 98 8 °F (37 1 °C) (21 1245)  Temp Source: Oral (21 1245)  Respirations: 22 (21 1600)  Height: 5' 6" (167 6 cm) (21 113)  Weight - Scale: 73 9 kg (163 lb) (21 113)  SpO2: 95 % (21 1600)    Physical Exam  Vitals and nursing note reviewed  Constitutional:       General: He is not in acute distress  Appearance: Normal appearance  He is well-developed  HENT:      Head: Normocephalic and atraumatic  Eyes:      General: No scleral icterus  Conjunctiva/sclera: Conjunctivae normal    Cardiovascular:      Rate and Rhythm: Normal rate and regular rhythm  Heart sounds: Murmur heard  Pulmonary:      Effort: Pulmonary effort is normal       Breath sounds: No wheezing, rhonchi or rales  Abdominal:      General: There is no distension  Palpations: Abdomen is soft  Musculoskeletal:      Right lower le+ Pitting Edema present  Left lower le+ Pitting Edema present  Skin:     General: Skin is warm and dry  Neurological:      General: No focal deficit present  Mental Status: He is alert     Psychiatric:         Mood and Affect: Mood normal         Additional Data:     Lab Results:  Results from last 7 days   Lab Units 21  1237   WBC Thousand/uL 9 70   HEMOGLOBIN g/dL 17 2*   HEMATOCRIT % 51 2*   PLATELETS Thousands/uL 146*   NEUTROS PCT % 78*   LYMPHS PCT % 8*   MONOS PCT % 13*   EOS PCT % 0     Results from last 7 days   Lab Units 09/03/21  1237   SODIUM mmol/L 138   POTASSIUM mmol/L 3 2*   CHLORIDE mmol/L 101   CO2 mmol/L 30   BUN mg/dL 15   CREATININE mg/dL 1 10   ANION GAP mmol/L 7   CALCIUM mg/dL 7 9*   ALBUMIN g/dL 2 5*   TOTAL BILIRUBIN mg/dL 0 80   ALK PHOS U/L 42*   ALT U/L 19   AST U/L 15   GLUCOSE RANDOM mg/dL 107     Results from last 7 days   Lab Units 09/03/21  1237   INR  2 32*             Results from last 7 days   Lab Units 09/03/21  1237   LACTIC ACID mmol/L 1 6       Imaging: Reviewed radiology reports from this admission including: chest xray and chest CT scan  CTA dissection protocol chest abdomen pelvis w wo contrast   Final Result by Hortensia Dumont MD (09/03 1516)   Addendum 1 of 1 by Hortensia Dumont MD (09/03 1516)   ADDENDUM:      This addendum corrects a dictation error within the body  Under VASCULAR    STRUCTURES, there IS evidence of a dissection flap in the aneurysmal    portion of the aorta  Final      1   3 4 cm saccular infrarenal abdominal aortic aneurysm, just above the iliac bifurcation, with focal dissection flap   2  Debris within the right lower lobe airways, concerning for aspiration  Given its focality, recommend follow-up unenhanced chest CT in one month to ensure resolution and exclude endobronchial neoplasm   3   4 3 cm ascending aortic aneurysm   4  Moderate rectal stool burden with focal wall thickening, may represent a mild colitis                Workstation performed: [id number]         XR chest portable   Final Result by Lisa Aranda MD (09/03 9950)      No active pulmonary disease on examination which is somewhat limited secondary to low lung volumes  Workstation performed: [id number]             EKG and Other Studies Reviewed on Admission:   · EKG: NSR  HR 88     ** Please Note: This note has been constructed using a voice recognition system   **

## 2021-09-04 PROBLEM — R13.10 DYSPHAGIA: Status: ACTIVE | Noted: 2021-09-04

## 2021-09-04 PROBLEM — K59.00 CONSTIPATION: Status: ACTIVE | Noted: 2021-09-04

## 2021-09-04 PROBLEM — J96.01 ACUTE RESPIRATORY FAILURE WITH HYPOXIA (HCC): Status: ACTIVE | Noted: 2021-09-03

## 2021-09-04 LAB
ANION GAP SERPL CALCULATED.3IONS-SCNC: 7 MMOL/L (ref 4–13)
BASOPHILS # BLD AUTO: 0.03 THOUSANDS/ΜL (ref 0–0.1)
BASOPHILS NFR BLD AUTO: 0 % (ref 0–1)
BUN SERPL-MCNC: 13 MG/DL (ref 5–25)
CALCIUM SERPL-MCNC: 7.9 MG/DL (ref 8.3–10.1)
CHLORIDE SERPL-SCNC: 102 MMOL/L (ref 100–108)
CO2 SERPL-SCNC: 31 MMOL/L (ref 21–32)
CREAT SERPL-MCNC: 1.06 MG/DL (ref 0.6–1.3)
EOSINOPHIL # BLD AUTO: 0.08 THOUSAND/ΜL (ref 0–0.61)
EOSINOPHIL NFR BLD AUTO: 1 % (ref 0–6)
ERYTHROCYTE [DISTWIDTH] IN BLOOD BY AUTOMATED COUNT: 13.4 % (ref 11.6–15.1)
GFR SERPL CREATININE-BSD FRML MDRD: 66 ML/MIN/1.73SQ M
GLUCOSE SERPL-MCNC: 97 MG/DL (ref 65–140)
HCT VFR BLD AUTO: 41.4 % (ref 36.5–49.3)
HGB BLD-MCNC: 14.1 G/DL (ref 12–17)
IMM GRANULOCYTES # BLD AUTO: 0.04 THOUSAND/UL (ref 0–0.2)
IMM GRANULOCYTES NFR BLD AUTO: 1 % (ref 0–2)
INR PPP: 3.08 (ref 0.84–1.19)
LYMPHOCYTES # BLD AUTO: 1.1 THOUSANDS/ΜL (ref 0.6–4.47)
LYMPHOCYTES NFR BLD AUTO: 13 % (ref 14–44)
MCH RBC QN AUTO: 34 PG (ref 26.8–34.3)
MCHC RBC AUTO-ENTMCNC: 34.1 G/DL (ref 31.4–37.4)
MCV RBC AUTO: 100 FL (ref 82–98)
MONOCYTES # BLD AUTO: 1.52 THOUSAND/ΜL (ref 0.17–1.22)
MONOCYTES NFR BLD AUTO: 18 % (ref 4–12)
NEUTROPHILS # BLD AUTO: 5.61 THOUSANDS/ΜL (ref 1.85–7.62)
NEUTS SEG NFR BLD AUTO: 67 % (ref 43–75)
NRBC BLD AUTO-RTO: 0 /100 WBCS
PLATELET # BLD AUTO: 113 THOUSANDS/UL (ref 149–390)
PMV BLD AUTO: 9.1 FL (ref 8.9–12.7)
POTASSIUM SERPL-SCNC: 3.4 MMOL/L (ref 3.5–5.3)
PROCALCITONIN SERPL-MCNC: 0.06 NG/ML
PROCALCITONIN SERPL-MCNC: 0.07 NG/ML
PROTHROMBIN TIME: 31.6 SECONDS (ref 11.6–14.5)
RBC # BLD AUTO: 4.15 MILLION/UL (ref 3.88–5.62)
SODIUM SERPL-SCNC: 140 MMOL/L (ref 136–145)
WBC # BLD AUTO: 8.38 THOUSAND/UL (ref 4.31–10.16)

## 2021-09-04 PROCEDURE — 85610 PROTHROMBIN TIME: CPT | Performed by: INTERNAL MEDICINE

## 2021-09-04 PROCEDURE — 85025 COMPLETE CBC W/AUTO DIFF WBC: CPT | Performed by: INTERNAL MEDICINE

## 2021-09-04 PROCEDURE — 92610 EVALUATE SWALLOWING FUNCTION: CPT

## 2021-09-04 PROCEDURE — 84145 PROCALCITONIN (PCT): CPT | Performed by: PHYSICIAN ASSISTANT

## 2021-09-04 PROCEDURE — 99232 SBSQ HOSP IP/OBS MODERATE 35: CPT | Performed by: INTERNAL MEDICINE

## 2021-09-04 PROCEDURE — 80048 BASIC METABOLIC PNL TOTAL CA: CPT | Performed by: INTERNAL MEDICINE

## 2021-09-04 RX ORDER — DOCUSATE SODIUM 100 MG/1
100 CAPSULE, LIQUID FILLED ORAL 2 TIMES DAILY
Status: DISCONTINUED | OUTPATIENT
Start: 2021-09-04 | End: 2021-09-09 | Stop reason: HOSPADM

## 2021-09-04 RX ORDER — FUROSEMIDE 10 MG/ML
20 INJECTION INTRAMUSCULAR; INTRAVENOUS
Status: DISPENSED | OUTPATIENT
Start: 2021-09-04 | End: 2021-09-05

## 2021-09-04 RX ORDER — SENNOSIDES 8.6 MG
2 TABLET ORAL
Status: DISCONTINUED | OUTPATIENT
Start: 2021-09-04 | End: 2021-09-09 | Stop reason: HOSPADM

## 2021-09-04 RX ORDER — BISACODYL 10 MG
10 SUPPOSITORY, RECTAL RECTAL DAILY PRN
Status: DISCONTINUED | OUTPATIENT
Start: 2021-09-04 | End: 2021-09-09 | Stop reason: HOSPADM

## 2021-09-04 RX ORDER — POLYETHYLENE GLYCOL 3350 17 G/17G
17 POWDER, FOR SOLUTION ORAL DAILY PRN
Status: DISCONTINUED | OUTPATIENT
Start: 2021-09-04 | End: 2021-09-09 | Stop reason: HOSPADM

## 2021-09-04 RX ADMIN — WARFARIN SODIUM 2.5 MG: 2.5 TABLET ORAL at 17:23

## 2021-09-04 RX ADMIN — DIVALPROEX SODIUM 250 MG: 250 TABLET, DELAYED RELEASE ORAL at 09:07

## 2021-09-04 RX ADMIN — DRONEDARONE 400 MG: 400 TABLET, FILM COATED ORAL at 09:08

## 2021-09-04 RX ADMIN — Medication 10 MG: at 22:00

## 2021-09-04 RX ADMIN — ACETAMINOPHEN 650 MG: 325 TABLET, FILM COATED ORAL at 09:43

## 2021-09-04 RX ADMIN — GUAIFENESIN 600 MG: 600 TABLET ORAL at 17:23

## 2021-09-04 RX ADMIN — POTASSIUM CHLORIDE 20 MEQ: 1500 TABLET, EXTENDED RELEASE ORAL at 17:23

## 2021-09-04 RX ADMIN — DOCUSATE SODIUM 100 MG: 100 CAPSULE, LIQUID FILLED ORAL at 17:23

## 2021-09-04 RX ADMIN — DRONEDARONE 400 MG: 400 TABLET, FILM COATED ORAL at 17:23

## 2021-09-04 RX ADMIN — SERTRALINE HYDROCHLORIDE 25 MG: 25 TABLET ORAL at 09:07

## 2021-09-04 RX ADMIN — DOCUSATE SODIUM 100 MG: 100 CAPSULE, LIQUID FILLED ORAL at 09:07

## 2021-09-04 RX ADMIN — SENNOSIDES 17.2 MG: 8.6 TABLET ORAL at 22:00

## 2021-09-04 RX ADMIN — FUROSEMIDE 20 MG: 10 INJECTION, SOLUTION INTRAVENOUS at 18:25

## 2021-09-04 RX ADMIN — GUAIFENESIN 600 MG: 600 TABLET ORAL at 09:08

## 2021-09-04 RX ADMIN — LEVOTHYROXINE SODIUM 50 MCG: 25 TABLET ORAL at 09:07

## 2021-09-04 RX ADMIN — CEFTRIAXONE 1000 MG: 1 INJECTION, SOLUTION INTRAVENOUS at 17:26

## 2021-09-04 RX ADMIN — POTASSIUM CHLORIDE 20 MEQ: 1500 TABLET, EXTENDED RELEASE ORAL at 09:07

## 2021-09-04 RX ADMIN — DIVALPROEX SODIUM 500 MG: 250 TABLET, DELAYED RELEASE ORAL at 22:00

## 2021-09-04 RX ADMIN — EZETIMIBE 10 MG: 10 TABLET ORAL at 09:07

## 2021-09-04 NOTE — SPEECH THERAPY NOTE
Speech-Language Pathology Bedside Swallow Evaluation    Patient Name: Robson Maldonado    [id number] Date: 9/4/2021     Problem List  Principal Problem:    Acute respiratory failure with hypoxia [facility])  Active Problems:    Atrial fibrillation ([facility] )    Hx of CABG    Alzheimer's dementia ([facility] )    AAA (abdominal aortic aneurysm) ([facility] )    Hypothyroidism    Hyperlipidemia    Constipation    Dysphagia      Past Medical History  Past Medical History:   Diagnosis Date    [facility]     [facility], chronic [facility]        Past Surgical History  Past Surgical History:   Procedure Laterality Date    APPENDECTOMY      CARDIAC PACEMAKER PLACEMENT      CORONARY ARTERY BYPASS GRAFT      HERNIA REPAIR         Summary  Pt presented with s/s suggestive of mild-moderate oropharyngeal dysphagia  Discussed at length with pt's wife at bedside who is his primary caregiver  She reported pt has been having more difficulty particularly with thin liquids, and she only provides very small sips tp prevent him from coughing/choking  No overt s/s aspiration noted with trials offered today, however very slight upper resp crackle audible after trials of thin could suggest aspiration  NTL trials were offered, which appeared to be swallowed with much better control/improved coordination  Family was educated on thickening liquids, food texture modification, where to purchase thickener, etc  Wife in agreement with plan to trial NTLs as there seems to be reasonable concern for aspiration of thin liquids, suspect d/t progression of Alzheimer's  ST will f/u during pt's hospitalization       Risk/s for Aspiration: mild-mod    Recommended Diet: mechanically altered/level 2 diet and nectar thick liquids   Recommended Form of Meds: crushed with puree   Aspiration precautions and swallowing strategies: upright posture, only feed when fully alert, slow rate of feeding, small bites/sips and alternating bites and sips  Other Recommendations: Continue frequent oral care, Allow pt to feed himself as able with close supervision, consider small spoon to limit bolus size and preserve independence  Current Medical Status per H&P 9/3/21  Scarlet Shore is a [de-identified] y o  male with a PMH of CABG x2, atrial fibrillation on warfarin, hyperlipidemia, hypertension, Alzheimer's dementia, hypothyroidism who presents with weakness, worsening cognitive status x1 day  Wife reports that when she was assisting patient with ambulation this morning he was weaker, more relating continued to want to sit down  At baseline he does know his own name and can recognize her but today is unable to state his own name  She is his primary caregiver and is very attentive  She is at his side at all times otherwise patient does become confused and begins to wander  Emergency department, imaging did reveal AAA  Vascular surgery was consulted and are recommending no surgical intervention at present  Suspect the underlying source of hypoxia is due to aspiration pneumonia  Patient does have some difficulty swallowing per wife  She reports she has tried to alter his diet but he has not been formally evaluated  Will have speech therapy see the patient here and have bedside nursing clear patient to continue with a cardiac diet until they are able to see him  Also, patient lower extremity edema  Wife reports he was previously on "a different water pill" he does follow with out of network Cardiology  He has not had an echocardiogram in 2 years  Will order  Wife says that if patient is medically cleared for discharge over the weekend she would not want to wait for echocardiogram and instead will follow up with primary outpatient cardiologist   He does have some lower extremity edema on admission  Will follow up on BNP, procalcitonin    If suggestions source of shortness of breath is aspiration pneumonia will not alter any diuretics however, if BNP returns elevated and patient procalcitonin is negative, would treat with diuresis  Wife reports that he does have lower extremity edema at baseline and this does not appear worse as of late  Special Studies:  CTA lungs 9/3/21 IMPRESSION:  1   3 4 cm saccular infrarenal abdominal aortic aneurysm, just above the iliac bifurcation, with focal dissection flap  2  Debris within the right lower lobe airways, concerning for aspiration  Given its focality, recommend follow-up unenhanced chest CT in one month to ensure resolution and exclude endobronchial neoplasm  3   4 3 cm ascending aortic aneurysm  4  Moderate rectal stool burden with focal wall thickening, may represent a mild colitis    Social/Education/Vocational Hx:  Pt lives with family    Swallow Information   Current Risks for Dysphagia & Aspiration: known history of dysphagia and Alzheimer's disease  Current Symptoms/Concerns: coughing with po  Current Diet: mechanically altered/level 2 diet and thin liquids   Baseline Diet: mechanically altered/level 2 diet and thin liquids      Baseline Assessment   Behavior/Cognition: alert and decreased attention  Speech/Language Status: not able to to follow commands consistently, limited verbal output  Patient Positioning: upright in bed  Pain Status/Interventions/Response to Interventions: No report of or nonverbal indications of pain         Swallow Mechanism Exam  Facial: masked facies  Labial: unable to test 2/2 limited command following  Lingual: unable to test 2/2 limited command following  Velum: unable to visualize  Mandible:  decreased ROM  Dentition: adequate  Vocal quality:clear/adequate   Volitional Cough: unable to initiate volitional cough   Respiratory Status: on 4L O2      Consistencies Assessed and Performance   Consistencies Administered: thin liquids, nectar thick, puree and soft solids  Materials administered included thin/NTL juice, apple sauce, banana    Oral Stage: mild-moderate, decreased bolus acceptance, decreased bolus propulsion, decreased mastication and suspected decreased control of thin liquids     Pharyngeal Stage: mild-moderate, suspected pharyngeal swallow delay and suspect reduced coordination    Esophageal Concerns: none reported    Strategies and Efficacy: small bites and sips    Summary and Recommendations (see above)    Results Reviewed with: RN, MD and family     Treatment Recommended: yes     Frequency of treatment: 2-3x/week    Dysphagia LTG  -Patient will demonstrate safe and effective oral intake (without overt s/s significant oral/pharyngeal dysphagia including s/s penetration or aspiration) for the highest appropriate diet level  Short Term Goals:  -Pt will tolerate Dysphagia 2/mechanical soft diet and nectar thick liquid with no significant s/s oral or pharyngeal dysphagia across 1-3 diagnostic sessions

## 2021-09-04 NOTE — ASSESSMENT & PLAN NOTE
Possible progression from Alzheimer's disease, history of CVA  Modified diet per speech dysphagia 2 with honey thick liquids

## 2021-09-04 NOTE — ASSESSMENT & PLAN NOTE
Possible progression from Alzheimer's disease, history of CVA  Modified diet, needs speech evaluation

## 2021-09-04 NOTE — ASSESSMENT & PLAN NOTE
· X2, 1991 and 2001  · Follows with Dr Ankita Meier- Cardiologist  · Pacemaker in place  · Maintained on warfarin

## 2021-09-04 NOTE — ASSESSMENT & PLAN NOTE
Present on admission as evidenced by hypoxia on room air, possible causes pain aspiration pneumonia/pneumonitis in the setting of dysphagia from progression of Alzheimer's disease, congestive heart failure it given presentation, labs, imaging findings  BNP elevated at greater than 900, imaging findings (my read) with right-sided infiltrate, raised hemidiaphragm on the left  Wean oxygen as able  Continue IV antibiotics #2  Procalcitonin is within normal limits, blood cultures are pending  Initiate Lasix 20 mg b i d , if creatinine rising will discontinue  Echocardiogram is pending  CPAP HS

## 2021-09-04 NOTE — ASSESSMENT & PLAN NOTE
· Noted on CTA:  4 3 cm ascending aortic aneurysm and 3 2 cm x 3 4 cm saccular infrarenal abdominal aortic aneurysm with focal dissection flap  · Continue to maintain blood pressure control, patient on atenolol and chlorthalidone, continue  · Outpatient follow-up with vascular surgery if needed  · Vascular surgery had determined patient is not a candidate for surgery, there is no acute intervention to be done  · This was discussed extensively with the wife and family at bedside on 09/04/2021  Informed that given patient's comorbidities including end-stage dementia, CABG x2, patient is likely not a candidate for repair/surgery  Wife agrees  Does not want to put him through a difficult surgery that may not improve his quality of life

## 2021-09-04 NOTE — ASSESSMENT & PLAN NOTE
· X2, 1991 and 2001  · Follows with Dr Howe Labor- Cardiologist  · Pacemaker in place  · Maintained on warfarin

## 2021-09-04 NOTE — ASSESSMENT & PLAN NOTE
Rate control with pacemaker and atenolol, anticoagulation with Coumadin, continue  INR was 2 46, trend daily

## 2021-09-04 NOTE — QUICK NOTE
Detailed discussion with patient's wife, daughter, son and son-in-law present in the room  Went through hospital course  Discussed decision making and treatment approach  Informed patient is probably not a good candidate for surgery

## 2021-09-04 NOTE — PROGRESS NOTES
[place]  Progress Note - Attila Ewing 1940, [de-identified] y o  male MRN: [id number]  Unit/Bed#: -01 Encounter: [id number]  Primary Care Provider: Yanely Ba MD   Date and time admitted to hospital: 9/3/2021 11:30 AM    * Acute respiratory failure with hypoxia [facility])  Assessment & Plan  Present on admission as evidenced by hypoxia on room air, possible causes pain aspiration pneumonia/pneumonitis in the setting of dysphagia from progression of Alzheimer's disease, congestive heart failure it given presentation, labs, imaging findings  BNP elevated at greater than 900, imaging findings (my read) with right-sided infiltrate, raised hemidiaphragm on the left  Wean oxygen as able  Continue IV antibiotics #2  Procalcitonin is within normal limits, blood cultures are pending  Initiate Lasix 20 mg b i d , if creatinine rising will discontinue, place condom catheter   Echocardiogram is pending  CPAP HS    AAA (abdominal aortic aneurysm) ([facility])  Assessment & Plan  · Noted on CTA:  4 3 cm ascending aortic aneurysm and 3 2 cm x 3 4 cm saccular infrarenal abdominal aortic aneurysm with focal dissection flap  · Continue to maintain blood pressure control, patient on atenolol and chlorthalidone, continue  · Outpatient follow-up with vascular surgery if needed  · Vascular surgery had determined patient is not a candidate for surgery, there is no acute intervention to be done  · This was discussed extensively with the wife at bedside on 09/04/2021  Informed the given patient's comorbidities including end-stage dementia, CABG x2, patient is likely not a candidate for repair/surgery  Wife agrees  Does not want to put him through a difficult surgery that may not improve his quality of life      Dysphagia  Assessment & Plan  Possible progression from Alzheimer's disease, history of CVA  Modified diet, needs speech evaluation    Constipation  Assessment & Plan  Suggested from imaging  Initiate bowel regimen  Give Dulcolax suppository x1    Hyperlipidemia  Assessment & Plan  · Maintained on ezetimibe  · Continue statin therapy    Hypothyroidism  Assessment & Plan  · Continue levothyroxine  · Outpatient monitoring of TSH    Alzheimer's dementia ([facility] )  Assessment & Plan  End-stage Alzheimer's  Optimize sleep-wake cycles  Maintained on melatonin, sertraline      Hx of CABG  Assessment & Plan  · X2,  and   · Follows with Dr Cecil Neir- Cardiologist  · Pacemaker in place  · Maintained on warfarin    Atrial fibrillation [facility])  Assessment & Plan  Rate control with pacemaker and atenolol, anticoagulation with Coumadin, continue  INR was 2 32, trend daily        VTE Pharmacologic Prophylaxis: VTE Score: 6 Moderate Risk (Score 3-4) - Pharmacological DVT Prophylaxis Ordered: warfarin (Coumadin)  Patient Centered Rounds: I performed bedside rounds with nursing staff today  Discussions with Specialists or Other Care Team Provider:     Education and Discussions with Family / Patient: Updated  (wife) at bedside  Time Spent for Care: 30 minutes  More than 50% of total time spent on counseling and coordination of care as described above  Current Length of Stay: 1 day(s)  Current Patient Status: Inpatient   Certification Statement: The patient will continue to require additional inpatient hospital stay due to Acute hypoxemic respiratory failure  Discharge Plan:   Code Status: Level 1 - Full Code    Subjective:   Presently confused  Was on HS CPAP overnight  He appears comfortable this morning  No overnight reports    Objective:     Vitals:   Temp (24hrs), Av 7 °F (37 6 °C), Min:98 8 °F (37 1 °C), Max:100 1 °F (37 8 °C)    Temp:  [98 8 °F (37 1 °C)-100 1 °F (37 8 °C)] 99 6 °F (37 6 °C)  HR:  [60-77] 66  Resp:  [18-24] 20  BP: ()/(50-62) 98/62  SpO2:  [87 %-95 %] 89 %  Body mass index is 26 31 kg/m²  Input and Output Summary (last 24 hours):      Intake/Output Summary (Last 24 hours) at 9/4/2021 0755  Last data filed at 9/3/2021 2011  Gross per 24 hour   Intake 120 ml   Output --   Net 120 ml       Physical Exam:   Physical Exam  Vitals and nursing note reviewed  Constitutional:       Appearance: He is well-developed  HENT:      Head: Normocephalic and atraumatic  Eyes:      Conjunctiva/sclera: Conjunctivae normal    Cardiovascular:      Rate and Rhythm: Normal rate  Rhythm irregular  Heart sounds: No murmur heard  Pulmonary:      Effort: Pulmonary effort is normal  No respiratory distress  Breath sounds: Rales present  Abdominal:      Palpations: Abdomen is soft  Tenderness: There is no abdominal tenderness  Musculoskeletal:      Cervical back: Neck supple  Right lower leg: Edema present  Left lower leg: Edema present  Skin:     General: Skin is warm and dry  Neurological:      Mental Status: He is alert  Mental status is at baseline            Additional Data:     Labs:  Results from last 7 days   Lab Units 09/03/21  1237   WBC Thousand/uL 9 70   HEMOGLOBIN g/dL 17 2*   HEMATOCRIT % 51 2*   PLATELETS Thousands/uL 146*   NEUTROS PCT % 78*   LYMPHS PCT % 8*   MONOS PCT % 13*   EOS PCT % 0     Results from last 7 days   Lab Units 09/03/21  1237   SODIUM mmol/L 138   POTASSIUM mmol/L 3 2*   CHLORIDE mmol/L 101   CO2 mmol/L 30   BUN mg/dL 15   CREATININE mg/dL 1 10   ANION GAP mmol/L 7   CALCIUM mg/dL 7 9*   ALBUMIN g/dL 2 5*   TOTAL BILIRUBIN mg/dL 0 80   ALK PHOS U/L 42*   ALT U/L 19   AST U/L 15   GLUCOSE RANDOM mg/dL 107     Results from last 7 days   Lab Units 09/03/21  1237   INR  2 32*             Results from last 7 days   Lab Units 09/03/21  1237   LACTIC ACID mmol/L 1 6   PROCALCITONIN ng/ml 0 06       Lines/Drains:  Invasive Devices     Peripheral Intravenous Line            Peripheral IV 09/03/21 Left Antecubital <1 day                      Imaging: Reviewed radiology reports from this admission including: chest xray and chest CT scan and Personally reviewed the following imaging: chest xray    Recent Cultures (last 7 days):   Results from last 7 days   Lab Units 09/03/21  1237   BLOOD CULTURE  Received in Microbiology Lab  Culture in Progress  Received in Microbiology Lab  Culture in Progress  Last 24 Hours Medication List:   Current Facility-Administered Medications   Medication Dose Route Frequency Provider Last Rate    acetaminophen  650 mg Oral Q6H PRN Dat Ovalles, PA-C      albuterol  2 5 mg Nebulization Q4H PRN Kayce Quiles MD      atenolol-chlorthalidone (TENORETIC 50/25) combination   Oral Daily [address] V, PA-C      bisacodyl  10 mg Rectal Daily PRN Kayce Quiles MD      cefTRIAXone  1,000 mg Intravenous Q24H Chichi [place] V, PA-C 1,000 mg (09/03/21 1816)    divalproex sodium  250 mg Oral QAM Chichi [place] V, PA-C      divalproex sodium  500 mg Oral HS Chichi [place] V, PA-C      docusate sodium  100 mg Oral BID Kayce Quiles MD      dronedarone  400 mg Oral BID With [employer]als [address] V, PA-C      ezetimibe  10 mg Oral Daily [address] V, PA-C      furosemide  20 mg Intravenous BID (diuretic) Kayce Quiles MD      guaiFENesin  600 mg Oral BID Chichi [place] V, PA-C      levothyroxine  50 mcg Oral Daily [address] V, PA-C      melatonin  10 mg Oral HS Chichi [place] V, PA-C      polyethylene glycol  17 g Oral Daily PRN Kayce Quiles MD      potassium chloride  20 mEq Oral  [address] V, PA-C      senna  2 tablet Oral HS Kayce Quiles MD      sertraline  25 mg Oral Daily [address] V, PA-C      warfarin  2 5 mg Oral Daily (warfarin) Terrence Helton PA-C          Today, Patient Was Seen By: Kayce Quiles MD    **Please Note: This note may have been constructed using a voice recognition system  **

## 2021-09-04 NOTE — ASSESSMENT & PLAN NOTE
· Noted on CTA:  4 3 cm ascending aortic aneurysm and 3 2 cm x 3 4 cm saccular infrarenal abdominal aortic aneurysm with focal dissection flap  · Continue to maintain blood pressure control, patient on atenolol and chlorthalidone, continue  · Outpatient follow-up with vascular surgery if needed  · Vascular surgery had determined patient is not a candidate for surgery, there is no acute intervention to be done  · This was discussed extensively with the wife at bedside on 09/04/2021  Informed the given patient's comorbidities including end-stage dementia, CABG x2, patient is likely not a candidate for repair/surgery  Wife agrees  Does not want to put him through a difficult surgery that may not improve his quality of life

## 2021-09-04 NOTE — ASSESSMENT & PLAN NOTE
Present on admission as evidenced by hypoxia on room air, possible causes pain aspiration pneumonia/pneumonitis in the setting of dysphagia from progression of Alzheimer's disease, congestive heart failure it given presentation, labs, imaging findings  BNP elevated at greater than 900, imaging findings (my read) with right-sided infiltrate, raised hemidiaphragm on the left  Wean oxygen as able, improved to 2L from 4  Continue IV antibiotics #3  Procalcitonin is within normal limits, blood cultures are without growth  Ireceived 1 dose of Lasix 20 mg on 05/04/2021 given low blood pressure, urine output 790 cc, net -310 cc, continue Lasix b i d   Today blood pressure tolerates   condom catheter, I/O, daily weights   Echocardiogram is pending  CPAP HS

## 2021-09-05 LAB
ANION GAP SERPL CALCULATED.3IONS-SCNC: 7 MMOL/L (ref 4–13)
BASOPHILS # BLD AUTO: 0.05 THOUSANDS/ΜL (ref 0–0.1)
BASOPHILS NFR BLD AUTO: 1 % (ref 0–1)
BUN SERPL-MCNC: 13 MG/DL (ref 5–25)
CALCIUM SERPL-MCNC: 7.9 MG/DL (ref 8.3–10.1)
CHLORIDE SERPL-SCNC: 101 MMOL/L (ref 100–108)
CO2 SERPL-SCNC: 29 MMOL/L (ref 21–32)
CREAT SERPL-MCNC: 1.14 MG/DL (ref 0.6–1.3)
EOSINOPHIL # BLD AUTO: 0.12 THOUSAND/ΜL (ref 0–0.61)
EOSINOPHIL NFR BLD AUTO: 2 % (ref 0–6)
ERYTHROCYTE [DISTWIDTH] IN BLOOD BY AUTOMATED COUNT: 12.8 % (ref 11.6–15.1)
GFR SERPL CREATININE-BSD FRML MDRD: 60 ML/MIN/1.73SQ M
GLUCOSE SERPL-MCNC: 104 MG/DL (ref 65–140)
HCT VFR BLD AUTO: 42.8 % (ref 36.5–49.3)
HGB BLD-MCNC: 14.6 G/DL (ref 12–17)
IMM GRANULOCYTES # BLD AUTO: 0.03 THOUSAND/UL (ref 0–0.2)
IMM GRANULOCYTES NFR BLD AUTO: 0 % (ref 0–2)
INR PPP: 2.46 (ref 0.84–1.19)
LYMPHOCYTES # BLD AUTO: 1.12 THOUSANDS/ΜL (ref 0.6–4.47)
LYMPHOCYTES NFR BLD AUTO: 14 % (ref 14–44)
MCH RBC QN AUTO: 33.6 PG (ref 26.8–34.3)
MCHC RBC AUTO-ENTMCNC: 34.1 G/DL (ref 31.4–37.4)
MCV RBC AUTO: 98 FL (ref 82–98)
MONOCYTES # BLD AUTO: 1.27 THOUSAND/ΜL (ref 0.17–1.22)
MONOCYTES NFR BLD AUTO: 16 % (ref 4–12)
NEUTROPHILS # BLD AUTO: 5.19 THOUSANDS/ΜL (ref 1.85–7.62)
NEUTS SEG NFR BLD AUTO: 67 % (ref 43–75)
NRBC BLD AUTO-RTO: 0 /100 WBCS
PLATELET # BLD AUTO: 110 THOUSANDS/UL (ref 149–390)
PMV BLD AUTO: 8.8 FL (ref 8.9–12.7)
POTASSIUM SERPL-SCNC: 3.2 MMOL/L (ref 3.5–5.3)
PROCALCITONIN SERPL-MCNC: 0.09 NG/ML
PROTHROMBIN TIME: 26.5 SECONDS (ref 11.6–14.5)
RBC # BLD AUTO: 4.35 MILLION/UL (ref 3.88–5.62)
SODIUM SERPL-SCNC: 137 MMOL/L (ref 136–145)
WBC # BLD AUTO: 7.78 THOUSAND/UL (ref 4.31–10.16)

## 2021-09-05 PROCEDURE — 84145 PROCALCITONIN (PCT): CPT | Performed by: PHYSICIAN ASSISTANT

## 2021-09-05 PROCEDURE — 85025 COMPLETE CBC W/AUTO DIFF WBC: CPT | Performed by: INTERNAL MEDICINE

## 2021-09-05 PROCEDURE — 85610 PROTHROMBIN TIME: CPT | Performed by: INTERNAL MEDICINE

## 2021-09-05 PROCEDURE — 99232 SBSQ HOSP IP/OBS MODERATE 35: CPT | Performed by: INTERNAL MEDICINE

## 2021-09-05 PROCEDURE — 94760 N-INVAS EAR/PLS OXIMETRY 1: CPT

## 2021-09-05 PROCEDURE — 80048 BASIC METABOLIC PNL TOTAL CA: CPT | Performed by: INTERNAL MEDICINE

## 2021-09-05 RX ORDER — FUROSEMIDE 10 MG/ML
20 INJECTION INTRAMUSCULAR; INTRAVENOUS
Status: COMPLETED | OUTPATIENT
Start: 2021-09-05 | End: 2021-09-06

## 2021-09-05 RX ADMIN — POTASSIUM CHLORIDE 20 MEQ: 1500 TABLET, EXTENDED RELEASE ORAL at 08:46

## 2021-09-05 RX ADMIN — SERTRALINE HYDROCHLORIDE 25 MG: 25 TABLET ORAL at 08:45

## 2021-09-05 RX ADMIN — DOCUSATE SODIUM 100 MG: 100 CAPSULE, LIQUID FILLED ORAL at 08:45

## 2021-09-05 RX ADMIN — DRONEDARONE 400 MG: 400 TABLET, FILM COATED ORAL at 08:46

## 2021-09-05 RX ADMIN — FUROSEMIDE 20 MG: 10 INJECTION, SOLUTION INTRAMUSCULAR; INTRAVENOUS at 17:00

## 2021-09-05 RX ADMIN — DOCUSATE SODIUM 100 MG: 100 CAPSULE, LIQUID FILLED ORAL at 17:17

## 2021-09-05 RX ADMIN — DRONEDARONE 400 MG: 400 TABLET, FILM COATED ORAL at 17:18

## 2021-09-05 RX ADMIN — SENNOSIDES 17.2 MG: 8.6 TABLET ORAL at 21:56

## 2021-09-05 RX ADMIN — Medication 10 MG: at 21:55

## 2021-09-05 RX ADMIN — LEVOTHYROXINE SODIUM 50 MCG: 25 TABLET ORAL at 08:45

## 2021-09-05 RX ADMIN — GUAIFENESIN 600 MG: 600 TABLET ORAL at 17:18

## 2021-09-05 RX ADMIN — WARFARIN SODIUM 2.5 MG: 2.5 TABLET ORAL at 17:17

## 2021-09-05 RX ADMIN — GUAIFENESIN 600 MG: 600 TABLET ORAL at 08:45

## 2021-09-05 RX ADMIN — CEFTRIAXONE 1000 MG: 1 INJECTION, SOLUTION INTRAVENOUS at 17:18

## 2021-09-05 RX ADMIN — EZETIMIBE 10 MG: 10 TABLET ORAL at 08:46

## 2021-09-05 RX ADMIN — POTASSIUM CHLORIDE 20 MEQ: 1500 TABLET, EXTENDED RELEASE ORAL at 17:18

## 2021-09-05 RX ADMIN — DIVALPROEX SODIUM 250 MG: 250 TABLET, DELAYED RELEASE ORAL at 08:45

## 2021-09-05 RX ADMIN — DIVALPROEX SODIUM 500 MG: 250 TABLET, DELAYED RELEASE ORAL at 21:56

## 2021-09-05 RX ADMIN — ACETAMINOPHEN 650 MG: 325 TABLET, FILM COATED ORAL at 20:09

## 2021-09-05 NOTE — PROGRESS NOTES
[place]  Progress Note - Karlo [place] 1940, [de-identified] y o  male MRN: [id number]  Unit/Bed#: -01 Encounter: [id number]  Primary Care Provider: Tanya Crane MD   Date and time admitted to hospital: 9/3/2021 11:30 AM    * Acute respiratory failure with hypoxia [facility])  Assessment & Plan  Present on admission as evidenced by hypoxia on room air, possible causes pain aspiration pneumonia/pneumonitis in the setting of dysphagia from progression of Alzheimer's disease, congestive heart failure it given presentation, labs, imaging findings  BNP elevated at greater than 900, imaging findings (my read) with right-sided infiltrate, raised hemidiaphragm on the left  Wean oxygen as able, improved to 2L from 4  Continue IV antibiotics #3  Procalcitonin is within normal limits, blood cultures are without growth  Ireceived 1 dose of Lasix 20 mg on 05/04/2021 given low blood pressure, urine output 790 cc, net -310 cc, continue Lasix b i d  Today blood pressure tolerates   condom catheter, I/O, daily weights   Echocardiogram is pending  CPAP HS    AAA (abdominal aortic aneurysm) (HCC)  Assessment & Plan  · Noted on CTA:  4 3 cm ascending aortic aneurysm and 3 2 cm x 3 4 cm saccular infrarenal abdominal aortic aneurysm with focal dissection flap  · Continue to maintain blood pressure control, patient on atenolol and chlorthalidone, continue  · Outpatient follow-up with vascular surgery if needed  · Vascular surgery had determined patient is not a candidate for surgery, there is no acute intervention to be done  · This was discussed extensively with the wife and family at bedside on 09/04/2021  Informed that given patient's comorbidities including end-stage dementia, CABG x2, patient is likely not a candidate for repair/surgery  Wife agrees  Does not want to put him through a difficult surgery that may not improve his quality of life      Dysphagia  Assessment & Plan  Possible progression from Alzheimer's disease, history of CVA  Modified diet per speech dysphagia 2 with honey thick liquids    Constipation  Assessment & Plan  Suggested from imaging  Initiated bowel regimen on 2021, had a bowel movement      Hyperlipidemia  Assessment & Plan  · Maintained on ezetimibe  · Continue statin therapy    Hypothyroidism  Assessment & Plan  · Continue levothyroxine  · Outpatient monitoring of TSH    Alzheimer's dementia ([facility] [address] )  Assessment & Plan  End-stage Alzheimer's  Optimize sleep-wake cycles  Maintained on melatonin, sertraline      Hx of CABG  Assessment & Plan  · X2,  and   · Follows with Dr Freddy Rajan- Cardiologist  · Pacemaker in place  · Maintained on warfarin    Atrial fibrillation [facility])  Assessment & Plan  Rate control with pacemaker and atenolol, anticoagulation with Coumadin, continue  INR was 2 46, trend daily          VTE Pharmacologic Prophylaxis: VTE Score: 6 Moderate Risk (Score 3-4) - Pharmacological DVT Prophylaxis Ordered: warfarin (Coumadin)  Patient Centered Rounds: I performed bedside rounds with nursing staff today  Discussions with Specialists or Other Care Team Provider:     Education and Discussions with Family / Patient: Updated  (wife) at bedside  Time Spent for Care: 30 minutes  More than 50% of total time spent on counseling and coordination of care as described above  Current Length of Stay: 2 day(s)  Current Patient Status: Inpatient   Certification Statement: The patient will continue to require additional inpatient hospital stay due to Acute hypoxemic respiratory failure due to aspiration pneumonitis/pneumonia  Discharge Plan: Anticipate discharge in 48 hrs to home with home services  Code Status: Level 1 - Full Code    Subjective:   Overnight events  Pleasantly confused  WIFE THINKS HE IS DOING SO MUCH BETTER      Objective:     Vitals:   Temp (24hrs), Av 1 °F (37 3 °C), Min:98 2 °F (36 8 °C), Max:100 3 °F (37 9 °C)    Temp:  [98 2 °F (36 8 °C)-100 3 °F (37 9 °C)] 99 3 °F (37 4 °C)  HR:  [65-85] 78  Resp:  [18-21] 18  BP: ()/(52-59) 110/57  SpO2:  [93 %-95 %] 94 %  Body mass index is 27 93 kg/m²  Input and Output Summary (last 24 hours): Intake/Output Summary (Last 24 hours) at 9/5/2021 0819  Last data filed at 9/5/2021 6332  Gross per 24 hour   Intake 480 ml   Output 1440 ml   Net -960 ml       Physical Exam:   Physical Exam  Vitals and nursing note reviewed  Constitutional:       Appearance: He is well-developed  HENT:      Head: Normocephalic and atraumatic  Eyes:      Conjunctiva/sclera: Conjunctivae normal    Cardiovascular:      Rate and Rhythm: Normal rate and regular rhythm  Heart sounds: No murmur heard  Pulmonary:      Effort: Pulmonary effort is normal  No respiratory distress  Breath sounds: Rales present  Abdominal:      Palpations: Abdomen is soft  Tenderness: There is no abdominal tenderness  Musculoskeletal:      Cervical back: Neck supple  Right lower leg: Edema present  Left lower leg: Edema present  Skin:     General: Skin is warm and dry  Neurological:      Mental Status: He is alert            Additional Data:     Labs:  Results from last 7 days   Lab Units 09/05/21  0518   WBC Thousand/uL 7 78   HEMOGLOBIN g/dL 14 6   HEMATOCRIT % 42 8   PLATELETS Thousands/uL 110*   NEUTROS PCT % 67   LYMPHS PCT % 14   MONOS PCT % 16*   EOS PCT % 2     Results from last 7 days   Lab Units 09/05/21  0518 09/03/21  1237   SODIUM mmol/L 137 138   POTASSIUM mmol/L 3 2* 3 2*   CHLORIDE mmol/L 101 101   CO2 mmol/L 29 30   BUN mg/dL 13 15   CREATININE mg/dL 1 14 1 10   ANION GAP mmol/L 7 7   CALCIUM mg/dL 7 9* 7 9*   ALBUMIN g/dL  --  2 5*   TOTAL BILIRUBIN mg/dL  --  0 80   ALK PHOS U/L  --  42*   ALT U/L  --  19   AST U/L  --  15   GLUCOSE RANDOM mg/dL 104 107     Results from last 7 days   Lab Units 09/05/21  0518   INR  2 46*             Results from last 7 days   Lab Units 09/04/21  0818 09/04/21  0453 09/03/21  1237   LACTIC ACID mmol/L  --   --  1 6   PROCALCITONIN ng/ml 0 07 0 06 0 06       Lines/Drains:  Invasive Devices     Peripheral Intravenous Line            Peripheral IV 09/03/21 Left Antecubital 1 day          Drain            External Urinary Catheter <1 day                      Imaging: No pertinent imaging reviewed  Recent Cultures (last 7 days):   Results from last 7 days   Lab Units 09/03/21  1237   BLOOD CULTURE  No Growth at 24 hrs  No Growth at 24 hrs         Last 24 Hours Medication List:   Current Facility-Administered Medications   Medication Dose Route Frequency Provider Last Rate    acetaminophen  650 mg Oral Q6H PRN Stevie Tolbert PA-C      albuterol  2 5 mg Nebulization Q4H PRN Ekta Mike MD      atenolol-chlorthalidone (TENORETIC 50/25) combination   Oral Daily [address] V, PA-C      bisacodyl  10 mg Rectal Daily PRN Ekta Mike MD      cefTRIAXone  1,000 mg Intravenous Q24H [facility] V, PACoreyC Stopped (09/04/21 1825)    divalproex sodium  250 mg Oral QAM Chichi Downs V, PA-C      divalproex sodium  500 mg Oral HS Chichi Downs V, PA-C      docusate sodium  100 mg Oral BID Ekta Mike MD      dronedarone  400 mg Oral BID With Meals [facility] V, PA-C      ezetimibe  10 mg Oral Daily [facility] V, PA-C      furosemide  20 mg Intravenous BID (diuretic) Ekta Mike MD      guaiFENesin  600 mg Oral BID Chichi Downs V, PA-JACOBO      levothyroxine  50 mcg Oral Daily [facility] V, PA-C      melatonin  10 mg Oral HS Chichi Downs V, PA-C      polyethylene glycol  17 g Oral Daily PRN Ekta Mike MD      potassium chloride  20 mEq Oral  [address] V, ERIK      senna  2 tablet Oral HS Ekta Mike MD      sertraline  25 mg Oral Daily [address] V, PACoreyC      warfarin  2 5 mg Oral Daily (warfarin) Gustabo Artis PA-C          Today, Patient Was Seen By: Mumtaz Duncan Mitchel Case MD    **Please Note: This note may have been constructed using a voice recognition system  **

## 2021-09-06 ENCOUNTER — APPOINTMENT (INPATIENT)
Dept: CT IMAGING | Facility: HOSPITAL | Age: 81
DRG: 189 | End: 2021-09-06
Payer: MEDICARE

## 2021-09-06 LAB
ANION GAP SERPL CALCULATED.3IONS-SCNC: 7 MMOL/L (ref 4–13)
BASOPHILS # BLD AUTO: 0.04 THOUSANDS/ΜL (ref 0–0.1)
BASOPHILS NFR BLD AUTO: 1 % (ref 0–1)
BUN SERPL-MCNC: 17 MG/DL (ref 5–25)
CALCIUM SERPL-MCNC: 7.8 MG/DL (ref 8.3–10.1)
CHLORIDE SERPL-SCNC: 101 MMOL/L (ref 100–108)
CO2 SERPL-SCNC: 30 MMOL/L (ref 21–32)
CREAT SERPL-MCNC: 1.22 MG/DL (ref 0.6–1.3)
EOSINOPHIL # BLD AUTO: 0.11 THOUSAND/ΜL (ref 0–0.61)
EOSINOPHIL NFR BLD AUTO: 2 % (ref 0–6)
ERYTHROCYTE [DISTWIDTH] IN BLOOD BY AUTOMATED COUNT: 12.7 % (ref 11.6–15.1)
GFR SERPL CREATININE-BSD FRML MDRD: 56 ML/MIN/1.73SQ M
GLUCOSE SERPL-MCNC: 93 MG/DL (ref 65–140)
HCT VFR BLD AUTO: 44.1 % (ref 36.5–49.3)
HGB BLD-MCNC: 14.8 G/DL (ref 12–17)
IMM GRANULOCYTES # BLD AUTO: 0.05 THOUSAND/UL (ref 0–0.2)
IMM GRANULOCYTES NFR BLD AUTO: 1 % (ref 0–2)
INR PPP: 1.92 (ref 0.84–1.19)
LYMPHOCYTES # BLD AUTO: 1.34 THOUSANDS/ΜL (ref 0.6–4.47)
LYMPHOCYTES NFR BLD AUTO: 18 % (ref 14–44)
MCH RBC QN AUTO: 33 PG (ref 26.8–34.3)
MCHC RBC AUTO-ENTMCNC: 33.6 G/DL (ref 31.4–37.4)
MCV RBC AUTO: 98 FL (ref 82–98)
MONOCYTES # BLD AUTO: 1.32 THOUSAND/ΜL (ref 0.17–1.22)
MONOCYTES NFR BLD AUTO: 18 % (ref 4–12)
NEUTROPHILS # BLD AUTO: 4.54 THOUSANDS/ΜL (ref 1.85–7.62)
NEUTS SEG NFR BLD AUTO: 60 % (ref 43–75)
NRBC BLD AUTO-RTO: 0 /100 WBCS
PLATELET # BLD AUTO: 130 THOUSANDS/UL (ref 149–390)
PMV BLD AUTO: 8.8 FL (ref 8.9–12.7)
POTASSIUM SERPL-SCNC: 3.2 MMOL/L (ref 3.5–5.3)
PROTHROMBIN TIME: 21.9 SECONDS (ref 11.6–14.5)
RBC # BLD AUTO: 4.48 MILLION/UL (ref 3.88–5.62)
SODIUM SERPL-SCNC: 138 MMOL/L (ref 136–145)
WBC # BLD AUTO: 7.4 THOUSAND/UL (ref 4.31–10.16)

## 2021-09-06 PROCEDURE — 97167 OT EVAL HIGH COMPLEX 60 MIN: CPT

## 2021-09-06 PROCEDURE — 99232 SBSQ HOSP IP/OBS MODERATE 35: CPT | Performed by: PHYSICIAN ASSISTANT

## 2021-09-06 PROCEDURE — 85610 PROTHROMBIN TIME: CPT | Performed by: INTERNAL MEDICINE

## 2021-09-06 PROCEDURE — 70450 CT HEAD/BRAIN W/O DYE: CPT

## 2021-09-06 PROCEDURE — 80048 BASIC METABOLIC PNL TOTAL CA: CPT | Performed by: INTERNAL MEDICINE

## 2021-09-06 PROCEDURE — 97530 THERAPEUTIC ACTIVITIES: CPT

## 2021-09-06 PROCEDURE — G1004 CDSM NDSC: HCPCS

## 2021-09-06 PROCEDURE — 85025 COMPLETE CBC W/AUTO DIFF WBC: CPT | Performed by: INTERNAL MEDICINE

## 2021-09-06 PROCEDURE — 97163 PT EVAL HIGH COMPLEX 45 MIN: CPT

## 2021-09-06 RX ORDER — ATENOLOL 25 MG/1
25 TABLET ORAL DAILY
Status: DISCONTINUED | OUTPATIENT
Start: 2021-09-07 | End: 2021-09-09 | Stop reason: HOSPADM

## 2021-09-06 RX ORDER — CEFDINIR 300 MG/1
300 CAPSULE ORAL EVERY 12 HOURS SCHEDULED
Qty: 10 CAPSULE | Refills: 0 | Status: CANCELLED | OUTPATIENT
Start: 2021-09-06 | End: 2021-09-11

## 2021-09-06 RX ORDER — WARFARIN SODIUM 5 MG/1
5 TABLET ORAL
Status: COMPLETED | OUTPATIENT
Start: 2021-09-06 | End: 2021-09-06

## 2021-09-06 RX ORDER — POTASSIUM CHLORIDE 14.9 MG/ML
20 INJECTION INTRAVENOUS
Status: DISCONTINUED | OUTPATIENT
Start: 2021-09-06 | End: 2021-09-06

## 2021-09-06 RX ORDER — WARFARIN SODIUM 2.5 MG/1
2.5 TABLET ORAL
Status: DISCONTINUED | OUTPATIENT
Start: 2021-09-07 | End: 2021-09-07

## 2021-09-06 RX ORDER — POTASSIUM CHLORIDE 20 MEQ/1
40 TABLET, EXTENDED RELEASE ORAL ONCE
Status: COMPLETED | OUTPATIENT
Start: 2021-09-06 | End: 2021-09-06

## 2021-09-06 RX ADMIN — DRONEDARONE 400 MG: 400 TABLET, FILM COATED ORAL at 08:20

## 2021-09-06 RX ADMIN — SENNOSIDES 17.2 MG: 8.6 TABLET ORAL at 21:43

## 2021-09-06 RX ADMIN — DIVALPROEX SODIUM 250 MG: 250 TABLET, DELAYED RELEASE ORAL at 08:20

## 2021-09-06 RX ADMIN — POTASSIUM CHLORIDE 40 MEQ: 1500 TABLET, EXTENDED RELEASE ORAL at 13:18

## 2021-09-06 RX ADMIN — WARFARIN SODIUM 5 MG: 5 TABLET ORAL at 17:08

## 2021-09-06 RX ADMIN — POTASSIUM CHLORIDE 20 MEQ: 1500 TABLET, EXTENDED RELEASE ORAL at 08:20

## 2021-09-06 RX ADMIN — SERTRALINE HYDROCHLORIDE 25 MG: 25 TABLET ORAL at 08:20

## 2021-09-06 RX ADMIN — CEFTRIAXONE 1000 MG: 1 INJECTION, SOLUTION INTRAVENOUS at 17:21

## 2021-09-06 RX ADMIN — DOCUSATE SODIUM 100 MG: 100 CAPSULE, LIQUID FILLED ORAL at 08:20

## 2021-09-06 RX ADMIN — GUAIFENESIN 600 MG: 600 TABLET ORAL at 08:20

## 2021-09-06 RX ADMIN — POTASSIUM CHLORIDE 20 MEQ: 1500 TABLET, EXTENDED RELEASE ORAL at 17:20

## 2021-09-06 RX ADMIN — DIVALPROEX SODIUM 500 MG: 250 TABLET, DELAYED RELEASE ORAL at 21:43

## 2021-09-06 RX ADMIN — LEVOTHYROXINE SODIUM 50 MCG: 25 TABLET ORAL at 08:20

## 2021-09-06 RX ADMIN — EZETIMIBE 10 MG: 10 TABLET ORAL at 08:20

## 2021-09-06 RX ADMIN — DRONEDARONE 400 MG: 400 TABLET, FILM COATED ORAL at 17:20

## 2021-09-06 RX ADMIN — ACETAMINOPHEN 650 MG: 325 TABLET, FILM COATED ORAL at 23:09

## 2021-09-06 RX ADMIN — FUROSEMIDE 20 MG: 10 INJECTION, SOLUTION INTRAMUSCULAR; INTRAVENOUS at 08:19

## 2021-09-06 RX ADMIN — DOCUSATE SODIUM 100 MG: 100 CAPSULE, LIQUID FILLED ORAL at 17:08

## 2021-09-06 RX ADMIN — GUAIFENESIN 600 MG: 600 TABLET ORAL at 17:08

## 2021-09-06 NOTE — PHYSICAL THERAPY NOTE
Physical Therapy Evaluation    Performed at least 2 patient identifiers during session:  Patient Active Problem List   Diagnosis    Atrial fibrillation ([facility] )    Hx of CABG    Alzheimer's dementia ([facility] )    AAA (abdominal aortic aneurysm) ([facility] )    Hypothyroidism    Hyperlipidemia    Acute respiratory failure with hypoxia ([facility] )    Constipation    Dysphagia       Past Medical History:   Diagnosis Date    Alzheimer's dementia ([facility] )     Atrial fibrillation, chronic ([facility] )        Past Surgical History:   Procedure Laterality Date    APPENDECTOMY      CARDIAC PACEMAKER PLACEMENT      CORONARY ARTERY BYPASS GRAFT      HERNIA REPAIR                                                                             PHYSICAL THERAPY EVAL     09/06/21 0902   PT Last Visit   PT Visit Date 09/06/21   Note Type   Note type Evaluation   Pain Assessment   Pain Assessment Tool Meng-Baker FACES   Pain Score No Pain   Home Living   Type of [address] One level  (1+1 BRIAN with 2 rails)   Prior Function   Level of [place] Needs assistance with ADLs and functional mobility; Needs assistance with IADLs   Lives With Spouse   Receives Help From Family   ADL Assistance Needs assistance   IADLs Needs assistance   Comments Spouse provides 24 hr/assist  Does not use a RW because patient is unable to use correctly   Restrictions/Precautions   Other Precautions Visual impairment; Fall Risk;Multiple lines; Bed Alarm;Cognitive   General   Additional Pertinent History hx of dementia   Family/Caregiver Present Yes  (Spouse present and assisted with mobility)   Cognition   Overall Cognitive Status Impaired   Arousal/Participation Lethargic   Orientation Level Oriented to person   Following Commands Follows one step commands inconsistently   Comments Responds well to spouse   RLE Assessment   RLE Assessment   (Unable to fully assess-diff following commands)   LLE Assessment   LLE Assessment   (unable to fully assess- diff following commands)   Bed Mobility   Supine to Sit 2  Maximal assistance   Additional items Assist x 2;HOB elevated; Bedrails; Increased time required;Verbal cues;LE management   Additional Comments Sat edge of bed approx 5 minutes with supervision  Required assistance to scoot hips forward towards edge of bed to get feet flat on ground  (resistant to mobility and movement at times)   Balance   Static Sitting Fair +   Dynamic Sitting Fair   Endurance Deficit   Endurance Deficit Yes   Activity Tolerance   Activity Tolerance Other (Comment)  (cognition)   Medical Staff Made Aware Chichi CALERO   Nurse Made Aware Patrick LANIER-present for part of session   Assessment   Prognosis Fair   Problem List Decreased strength;Decreased range of motion;Decreased endurance; Impaired balance;Decreased mobility; Decreased cognition; Impaired vision   Assessment Patient is an [de-identified] M who presented with change in MS, weakness and SOB  Patient resides with spouse in a home with 1+1 BRIAN  Requires assistance with all mobility and ADL's  Spouse reports that he is unable to use a RW as he has trouble with sequencing and navigation  Current medical status includes poor cognition, difficulty following commands, fall risk, bed alarm, limited verbal communication, decreased vision, decreased strength, ROM, balance, endurance and mobility  Patient required repeated cues for mobility  Spouse present for session-patient responds well to spouse  Required assistance of 2 for bed mobility  Spouse plans to get hospital bed  Patient appears to be requiring more assistance compared to PLOF  Recommending home with 24 hr assist and home PT verse rehab  Dependent upon how much assistance spouse can provide and get at home  The patient's AM-PAC Basic Mobility Inpatient Short Form Low Function Raw Score 14 , Standardized Score is 22 01  A standardized score less 42 9 suggests the patient may benefit from discharge to post-acute rehab services   Please also refer to the recommendation of the Physical Therapist for safe discharge planning  Goals   Patient Goals None stated  (Patient with limited verbal communication  Replied yes )   STG Expiration Date 09/20/21   Short Term Goal #1 1  Perform Rolling L and R with bedrails mod A x 1 2  Perform supine<>sit with HOB flat with use of bedrails mod A x 1 3  Perform sit<>stand transfers mod A x 1    Short Term Goal #2 4  Amb 25ft with min A x 1 without AD   (5  Ascend/descend 1 curb step with mod A x 1)   PT Treatment Day 0   Plan   Treatment/Interventions Functional transfer training;LE strengthening/ROM; Elevations; Therapeutic exercise; Endurance training;Patient/family training;Equipment eval/education; Bed mobility;Gait training;Spoke to nursing;OT   PT Frequency Other (Comment)  (3-5x/wk)   Recommendation   PT Discharge Recommendation   (Rehab verse home with 24 hr assist and home P T )   PT - OK to Discharge Yes   Additional Comments When medically stable   AM-PAC Basic Mobility Inpatient   Turning in Bed Without Bedrails 2   Lying on Back to Sitting on Edge of Flat Bed 2   Moving Bed to Chair 1   Standing Up From Chair 2   Walk in Room 1   Climb 3-5 Stairs 1   Basic Mobility Inpatient Raw Score 9   Turning Head Towards Sound 3   Follow Simple Instructions 2   Low Function Basic Mobility Raw Score 14   Low Function Basic Mobility Standardized Score 22 01         Physical therapy treatment note:    S: Patient mostly nonverbal  O: sit<>stand transfer mod A x 2  Patient in very flexed position with b/l knees flexed  Stood for approx 3 minutes with repeated cues for posture  Patient amb 2 side steps towards head of bed with min A x 2  Sit to supine max A x 2 with HOB elevated and use of bedrails  Patient was dependent to be pulled up towards head od bed while in supine  Max A x 1 for rolling to change linens  A: Patient with limited mobility due to cognition and generalized weakness   Patient somewhat resistant to movement at times but responded well to wife  P: Recommending home with 24 hr assist and home P T  verse rehab  Dependent upon assistance spouse can provide  Chiqui Rosario PT    Patient Name: Karen Begum  JFECX'M Date: 9/6/2021

## 2021-09-06 NOTE — ASSESSMENT & PLAN NOTE
· X2, 1991 and 2001  · Follows with Dr Ac Hearn- Cardiologist  · Pacemaker in place  · Maintained on warfarin

## 2021-09-06 NOTE — PLAN OF CARE
Problem: PHYSICAL THERAPY ADULT  Goal: Performs mobility at highest level of function for planned discharge setting  See evaluation for individualized goals  Description: Treatment/Interventions: Functional transfer training, LE strengthening/ROM, Elevations, Therapeutic exercise, Endurance training, Patient/family training, Equipment eval/education, Bed mobility, Gait training, Spoke to nursing, OT          See flowsheet documentation for full assessment, interventions and recommendations  Note: Prognosis: Fair  Problem List: Decreased strength, Decreased range of motion, Decreased endurance, Impaired balance, Decreased mobility, Decreased cognition, Impaired vision  Assessment: Patient is an [de-identified] M who presented with change in MS, weakness and SOB  Patient resides with spouse in a home with 1+1 BRIAN  Requires assistance with all mobility and ADL's  Spouse reports that he is unable to use a RW as he has trouble with sequencing and navigation  Current medical status includes poor cognition, difficulty following commands, fall risk, bed alarm, limited verbal communication, decreased vision, decreased strength, ROM, balance, endurance and mobility  Patient required repeated cues for mobility  Spouse present for session-patient responds well to spouse  Required assistance of 2 for bed mobility  Spouse plans to get hospital bed  Patient appears to be requiring more assistance compared to PLOF  Recommending home with 24 hr assist and home PT verse rehab  Dependent upon how much assistance spouse can provide and get at home  The patient's AM-PAC Basic Mobility Inpatient Short Form Low Function Raw Score 14 , Standardized Score is 22 01  A standardized score less 42 9 suggests the patient may benefit from discharge to post-acute rehab services  Please also refer to the recommendation of the Physical Therapist for safe discharge planning             PT Discharge Recommendation:  (Rehab verse home with 24 hr assist and home P T )     PT - OK to Discharge: Yes    See flowsheet documentation for full assessment

## 2021-09-06 NOTE — ASSESSMENT & PLAN NOTE
· Present on admission as evidenced by hypoxia on room air, possible causes pain aspiration pneumonia/pneumonitis in the setting of dysphagia from progression of Alzheimer's disease, congestive heart failure it given presentation, labs, imaging findings  · BNP elevated at greater than 900, imaging findings (my read) with right-sided infiltrate, raised hemidiaphragm on the left  · Wean oxygen as able, improved to 2L from 4- now trailing on RA, if stable, discharge within 24 hours  · Continue IV antibiotics - Rocephin, transition to DIXON DAVID for discharge  · Procalcitonin is within normal limits, blood cultures are without growth  · Received 1 dose of Lasix 20 mg on 05/04/2021 given low blood pressure, urine output 790 cc, net -310 cc, continue Lasix b i d  today blood pressure tolerates   · Condom catheter, I/O, daily weights   · Echocardiogram is pending  · CPAP HS

## 2021-09-06 NOTE — PLAN OF CARE
Problem: OCCUPATIONAL THERAPY ADULT  Goal: Performs self-care activities at highest level of function for planned discharge setting  See evaluation for individualized goals  Description:   Outcome: Progressing  Note: Limitation: Decreased ADL status, Decreased cognition, Decreased endurance, Decreased self-care trans  Prognosis: Guarded  Assessment: Pt is a [de-identified] y o  male seen for OT evaluation at [address] [place] [address], admitted 9/3/2021 w/ Acute respiratory failure with hypoxia ([facility] [address] )  OT completed extensive review of pt's medical and social history  Comorbidities affecting pt's functional performance at time of assessment include: aFib, hx CABG, Alzheimer's dementia, respiratory failure, dysphagia  Personal factors affecting pt at time of IE include:steps to enter environment, behavioral pattern, difficulty performing ADLS, limited insight into deficits, compliance, flat affect, decreased initiation and engagement  and health management   Prior to admission, pt was living with spouse in [facility], 1+1 BRIAN and was assisted for all care by wife  Upon evaluation, pt presents to OT below baseline due to the following performance deficits: weakness, decreased balance and impaired sequencing  Pt currently requiring Ax2 which presents a problem with going home with spouse only  Pt to benefit from continued skilled OT tx while in the hospital to address deficits as defined above and maximize level of functional independence w ADL's and functional mobility  Occupational Performance areas to address include: eating, grooming, bathing/shower, toilet hygiene, dressing, functional mobility and bed mobility, functional transfers  The patient's raw score on the AM-PAC Daily Activity inpatient short form is 7, standardized score is   less than 39 4  Patients at this level are likely to benefit from DC to post-acute rehabilitation services  Based on findings, pt is of high complexity   Pt co-treated with physical therapy due to cognitive/behavior challenge, and skilled assist x2 therapists required for safety  At this time, OT recommendations at time of discharge are STR vs home with spouse  STR is ideal but due to pts severe dementia, he may do better in home environment with spouse  In which case, spouse will need increased caregiver support due to pts current assist level, in addition to home OT       OT Discharge Recommendation: Post acute rehabilitation services (vs home w 24hr, increased support, home OT)  OT - OK to Discharge: No (needs adquate d/c plan)

## 2021-09-06 NOTE — OCCUPATIONAL THERAPY NOTE
Occupational Therapy Evaluation      Issa Mi    9/6/2021    Principal Problem:    Acute respiratory failure with hypoxia [facility])  Active Problems:    Atrial fibrillation ([facility] )    Hx of CABG    Alzheimer's dementia ([facility] )    AAA (abdominal aortic aneurysm) ([facility] )    Hypothyroidism    Hyperlipidemia    Constipation    Dysphagia      Past Medical History:   Diagnosis Date    [facility]'s dementia ([facility] )     Atrial fibrillation, chronic ([facility] )        Past Surgical History:   Procedure Laterality Date    APPENDECTOMY      CARDIAC PACEMAKER PLACEMENT      CORONARY ARTERY BYPASS GRAFT      HERNIA REPAIR          09/06/21 0901   OT Last Visit   OT Visit Date 09/06/21   Note Type   Note type Evaluation   Restrictions/Precautions   Weight Bearing Precautions Per Order No   Other Precautions Visual impairment; Fall Risk;Multiple lines; Bed Alarm;Cognitive   Pain Assessment   Pain Assessment Tool Meng-Baker FACES   Pain Score No Pain   Home Living   Type of [address] [place] [address] One level  (1+1STE w rail)   Home Equipment Wheelchair-manual  (Lift chair)   Prior Function   Level of [place] Needs assistance with ADLs and functional mobility   Lives With Spouse   Receives Help From Family   ADL Assistance Needs assistance   IADLs Needs assistance   Comments Spouse is 24/7 caregiver  Psychosocial   Psychosocial (WDL) X   Patient Behaviors/Mood Flat affect; Anxious   Length of Time/Family Visitation   (wife present)   ADL   Eating Assistance 4  Minimal Assistance  (wife cuts up all food and supervises at baseline)   Grooming Assistance 2  Maximal Assistance   UB Bathing Assistance 2  Maximal Assistance   LB [address] 2  Maximal Assistance   UB Dressing Assistance 2  Maximal Assistance   LB Dressing Assistance 2  Maximal [address]  2  Maximal Assistance   Bed Mobility   Supine to Sit 2  Maximal assistance   Additional items Assist x 2;HOB elevated; Bedrails; Increased time required;Verbal cues;LE management   Additional Comments Sat EOB approx 5 mins   Transfers   Sit to Stand 2  Maximal assistance   Additional items Assist x 2   Stand to Sit 2  Maximal assistance   Additional items Assist x 2   Additional Comments Wife uses handheld assist at home   Activity Tolerance   Activity Tolerance Other (Comment)  (cognition)   Medical Staff Made Aware PT Michelle   Nurse Made Aware YOLANDE Maxwell Fam- present   RUE Assessment   RUE Assessment   (unable to test)   LUE Assessment   LUE Assessment   (unable to test)   Cognition   Overall Cognitive Status Impaired   Arousal/Participation Arousable   Attention Difficulty attending to directions   Orientation Level Oriented to person   Memory Unable to assess   Following Commands Follows one step commands inconsistently   Comments Responds well to spouse cues  Resistive at times with staff  Assessment   Limitation Decreased ADL status; Decreased cognition;Decreased endurance;Decreased self-care trans   Prognosis Guarded   Assessment Pt is a [de-identified] y o  male seen for OT evaluation at [address] [place] [address], admitted 9/3/2021 w/ Acute respiratory failure with hypoxia ([facility] )  OT completed extensive review of pt's medical and social history  Comorbidities affecting pt's functional performance at time of assessment include: aFib, hx CABG, Alzheimer's dementia, respiratory failure, dysphagia  Personal factors affecting pt at time of IE include:steps to enter environment, behavioral pattern, difficulty performing ADLS, limited insight into deficits, compliance, flat affect, decreased initiation and engagement  and health management   Prior to admission, pt was living with spouse in 1st fl setup, 1+1 BRIAN and was assisted for all care by wife  Upon evaluation, pt presents to OT below baseline due to the following performance deficits: weakness, decreased balance and impaired sequencing  Pt currently requiring Ax2 which presents a problem with going home with spouse only   Pt to benefit from continued skilled OT tx while in the hospital to address deficits as defined above and maximize level of functional independence w ADL's and functional mobility  Occupational Performance areas to address include: eating, grooming, bathing/shower, toilet hygiene, dressing, functional mobility and bed mobility, functional transfers  The patient's raw score on the AM-PAC Daily Activity inpatient short form is 7, standardized score is   less than 39 4  Patients at this level are likely to benefit from DC to post-acute rehabilitation services  Based on findings, pt is of high complexity  Pt co-treated with physical therapy due to cognitive/behavior challenge, and skilled assist x2 therapists required for safety  At this time, OT recommendations at time of discharge are STR vs home with spouse  STR is ideal but due to pts severe dementia, he may do better in home environment with spouse  In which case, spouse will need increased caregiver support due to pts current assist level, in addition to home OT  Plan   Treatment Interventions ADL retraining;Functional transfer training; Endurance training;Cognitive reorientation;Patient/family training;Equipment evaluation/education; Compensatory technique education;Continued evaluation; Energy conservation   Goal Expiration Date 09/16/21   OT Frequency 3-5x/wk   Recommendation   OT Discharge Recommendation Post acute rehabilitation services  (vs home w 24hr, increased support, home OT)   OT - OK to Discharge No  (needs adquate d/c plan)   Additional Comments  Pt would benefit from remaining with spouse in home environment, but is currently requiring max A x2  Pt would need rehab or increased assistance at home     AM-PAC Daily Activity Inpatient   Lower Body Dressing 1   Bathing 1   Toileting 1   Upper Body Dressing 1   Grooming 1   Eating 2   Daily Activity Raw Score 7   Turning Head Towards Sound 3   Follow Simple Instructions 2   Low Function Daily Activity Raw Score 12 Low Function Daily Activity Standardized Score 21 38   AM-PAC Applied Cognition Inpatient   Following a Speech/Presentation 2   Understanding Ordinary Conversation 2   Taking Medications 1   Remembering Where Things Are Placed or Put Away 1   Remembering List of 4-5 Errands 1   Taking Care of Complicated Tasks 1   Applied Cognition Raw Score 8   Applied Cognition Standardized Score 19 32     Pt will achieve the following goals within 10 days  *Pt will complete feeding with setup  *Pt will perform side to side rolling with Mod x1 and grab bar for caregiver support of rosy hygiene/toileting management  *Pt will complete bed mobility (supine to sit, sit to supine) with Mod x1  *Pt will perform functional transfers with Mod x1 in order to complete ADL routine  *Pt will increase standing tolerance to 2-3 minutes in order to complete LB dressing with caregiver  *Pt will demonstrate increased activity tolerance in order to complete ADL routine      Luv Rink, MS, OTR/L

## 2021-09-06 NOTE — ASSESSMENT & PLAN NOTE
· Rate control with pacemaker and atenolol, anticoagulation with Coumadin, continue  · INR 1 92 today, will give 5mg instead of 2 5mg tonight

## 2021-09-06 NOTE — CASE MANAGEMENT
LOS: 3 days  Bundle: No  Unplanned Readmission Score: 14 Green  30 Day Readmission: No    Call back received from wife Roberto Salcidow  Talked with wife on the phone  Explained the role of CM  Obtained the following information from patient  Home: [place] step to enter  Lives With: Wife  ADL's: Wife assists with all ADL's   DME: Walker/Transport WC  Ambulation: Wife holds patients hands and she walks backwards to assist with ambulation  Transportation: Wife  Pharmacy: [facility] in [place]  PCP: Sharon Murillo MD  [facility] Hx: [facility]  Rehab Hx: No  Mental Health Hx: No  Substance Abuse Hx: No  Employment:Retired  POA/LW/AD: Yes wife/No/No  Transport at D/C: Wife    Discussed with wife therapy recommendations [facility] vs [facility]  Wife wants patient to DC home with [facility] services  They have used [facility] in past and request referral made to them  Referral made via [address]  Wife is requesting a WC  WC ordered via [place]  Request sent that DME calls wife to discuss OP costs if any  CM reviewed d/c planning process including the following: identifying help at home, patient preferences for d/c planning needs,  availability of treatment team to discuss questions or concerns patient and/or family may have regarding understanding medications and recognizing signs and symptoms once discharged

## 2021-09-06 NOTE — PROGRESS NOTES
[place]  Progress Note - Van Beat 1940, [de-identified] y o  male MRN: [id number]  Unit/Bed#: -01 Encounter: [id number]  Primary Care Provider: Jumana Perez MD   Date and time admitted to hospital: 9/3/2021 11:30 AM    * Acute respiratory failure with hypoxia [facility])  Assessment & Plan  · Present on admission as evidenced by hypoxia on room air, possible causes pain aspiration pneumonia/pneumonitis in the setting of dysphagia from progression of Alzheimer's disease, congestive heart failure it given presentation, labs, imaging findings  · BNP elevated at greater than 900, imaging findings (my read) with right-sided infiltrate, raised hemidiaphragm on the left  · Wean oxygen as able, improved to 2L from 4- now trailing on RA, if stable, discharge within 24 hours  · Continue IV antibiotics - Rocephin, transition to DIXON DAVID for discharge  · Procalcitonin is within normal limits, blood cultures are without growth  · Received 1 dose of Lasix 20 mg on 05/04/2021 given low blood pressure, urine output 790 cc, net -310 cc, continue Lasix b i d  today blood pressure tolerates   · Condom catheter, I/O, daily weights   · Echocardiogram is pending  · CPAP HS    Dysphagia  Assessment & Plan  · Possible progression from Alzheimer's disease, history of CVA  · Modified diet per speech dysphagia 2 with honey thick liquids    Constipation  Assessment & Plan  · Suggested from imaging  · Initiated bowel regimen on 09/04/2021, had a bowel movement      Hyperlipidemia  Assessment & Plan  · Maintained on ezetimibe  · Continue statin therapy    Hypothyroidism  Assessment & Plan  · Continue levothyroxine  · Outpatient monitoring of TSH    AAA (abdominal aortic aneurysm) (HCC)  Assessment & Plan  · Noted on CTA:  4 3 cm ascending aortic aneurysm and 3 2 cm x 3 4 cm saccular infrarenal abdominal aortic aneurysm with focal dissection flap    · Continue to maintain blood pressure control, patient on atenolol and chlorthalidone, continue  · Outpatient follow-up with vascular surgery if needed  · Vascular surgery had determined patient is not a candidate for surgery, there is no acute intervention to be done  · This was discussed extensively with the wife and family at bedside on 09/04/2021  Informed that given patient's comorbidities including end-stage dementia, CABG x2, patient is likely not a candidate for repair/surgery  Wife agrees  Does not want to put him through a difficult surgery that may not improve his quality of life  Alzheimer's dementia ([facility] )  Assessment & Plan  · End-stage Alzheimer's  · Wife is 24/7 caregiver  · Optimize sleep-wake cycles  · Maintained on melatonin, sertraline      Hx of CABG  Assessment & Plan  · X2, 1991 and 2001  · Follows with Dr Joel Caraballo- Cardiologist  · Pacemaker in place  · Maintained on warfarin    Atrial fibrillation ([facility] )  Assessment & Plan  · Rate control with pacemaker and atenolol, anticoagulation with Coumadin, continue  · INR 1 92 today, will give 5mg instead of 2 5mg tonight      VTE Pharmacologic Prophylaxis: VTE Score: 6 High Risk (Score >/= 5) - Pharmacological DVT Prophylaxis Ordered: warfarin (Coumadin)  Sequential Compression Devices Ordered  Patient Centered Rounds: I performed bedside rounds with nursing staff today  Discussions with Specialists or Other Care Team Provider:  Discussed with attending he was also able to speak with patient's family  Family is distressed about returning home but are also adamantly refusing rehab  CT scan of the head has been ordered  Spoke with case management who has been able to arrange for home health services    Education and Discussions with Family / Patient: Updated  (wife, daughter, son in law and granddaughter) at bedside  Wife adamantly refused rehab and wants patient to return home with home rehab  However, also feels that patient is too weak to return home  Time Spent for Care: 60 minutes  More than 50% of total time spent on counseling and coordination of care as described above  Current Length of Stay: 3 day(s)  Current Patient Status: Inpatient   Certification Statement: The patient will continue to require additional inpatient hospital stay due to Discharge planning, pending CT head  Discharge Plan: Anticipate discharge in 24-48 hrs to home with home services  Code Status: Level 1 - Full Code    Subjective:   Patient minimally interactive, spoke primarily with patient's wife and family  He appears comfortable and in no acute distress  Objective:     Vitals:   Temp (24hrs), Av 4 °F (37 4 °C), Min:98 3 °F (36 8 °C), Max:101 1 °F (38 4 °C)    Temp:  [98 3 °F (36 8 °C)-101 1 °F (38 4 °C)] 99 1 °F (37 3 °C)  HR:  [74-88] 74  Resp:  [18-19] 18  BP: ()/(55-63) 99/61  SpO2:  [87 %-92 %] 91 %  Body mass index is 27 19 kg/m²  Input and Output Summary (last 24 hours): Intake/Output Summary (Last 24 hours) at 2021 1413  Last data filed at 2021 1336  Gross per 24 hour   Intake 1560 ml   Output 2500 ml   Net -940 ml       Physical Exam:   Physical Exam  Vitals and nursing note reviewed  Constitutional:       General: He is not in acute distress  Appearance: Normal appearance  He is well-developed  HENT:      Head: Normocephalic and atraumatic  Eyes:      General: No scleral icterus  Conjunctiva/sclera: Conjunctivae normal       Comments: Baseline right eye deficit s/p removal   Cardiovascular:      Rate and Rhythm: Normal rate and regular rhythm  Heart sounds: No murmur heard  Pulmonary:      Effort: Pulmonary effort is normal       Breath sounds: No wheezing, rhonchi or rales  Abdominal:      General: There is no distension  Palpations: Abdomen is soft  Skin:     General: Skin is warm and dry  Neurological:      General: No focal deficit present  Mental Status: He is alert     Psychiatric:         Mood and Affect: Mood normal  Cognition and Memory: Cognition is impaired  Memory is impaired  Additional Data:     Labs:  Results from last 7 days   Lab Units 09/06/21  0452   WBC Thousand/uL 7 40   HEMOGLOBIN g/dL 14 8   HEMATOCRIT % 44 1   PLATELETS Thousands/uL 130*   NEUTROS PCT % 60   LYMPHS PCT % 18   MONOS PCT % 18*   EOS PCT % 2     Results from last 7 days   Lab Units 09/06/21  0452 09/03/21  1237   SODIUM mmol/L 138 138   POTASSIUM mmol/L 3 2* 3 2*   CHLORIDE mmol/L 101 101   CO2 mmol/L 30 30   BUN mg/dL 17 15   CREATININE mg/dL 1 22 1 10   ANION GAP mmol/L 7 7   CALCIUM mg/dL 7 8* 7 9*   ALBUMIN g/dL  --  2 5*   TOTAL BILIRUBIN mg/dL  --  0 80   ALK PHOS U/L  --  42*   ALT U/L  --  19   AST U/L  --  15   GLUCOSE RANDOM mg/dL 93 107     Results from last 7 days   Lab Units 09/06/21  0452   INR  1 92*             Results from last 7 days   Lab Units 09/05/21  0518 09/04/21  0818 09/04/21  0453 09/03/21  1237   LACTIC ACID mmol/L  --   --   --  1 6   PROCALCITONIN ng/ml 0 09 0 07 0 06 0 06       Lines/Drains:  Invasive Devices     Peripheral Intravenous Line            Peripheral IV 09/03/21 Left Antecubital 3 days          Drain            External Urinary Catheter 2 days                      Imaging: Personally reviewed the following imaging: CT head    Recent Cultures (last 7 days):   Results from last 7 days   Lab Units 09/03/21  1237   BLOOD CULTURE  No Growth at 48 hrs  No Growth at 48 hrs         Last 24 Hours Medication List:   Current Facility-Administered Medications   Medication Dose Route Frequency Provider Last Rate    acetaminophen  650 mg Oral Q6H PRN Gina Beck PA-C      albuterol  2 5 mg Nebulization Q4H PRN Nano Antoine MD      bisacodyl  10 mg Rectal Daily PRN Nano Antoine MD      cefTRIAXone  1,000 mg Intravenous Q24H Chichi FRANKLIN PA-C 1,000 mg (09/05/21 1718)    divalproex sodium  250 mg Oral QAM Chichi FRANKLIN PA-C      divalproex sodium  500 mg Oral HS Chichi Downs V ERIK      docusate sodium  100 mg Oral BID Glen Calvo MD      dronedarone  400 mg Oral BID [facility] V, ERIK      ezetimibe  10 mg Oral Daily [facility] V, ERIK      guaiFENesin  600 mg Oral BID [facility] V, ERIK      levothyroxine  50 mcg Oral Daily Chichi [facility] V, ERIK      polyethylene glycol  17 g Oral Daily PRN Glen Calvo MD      potassium chloride  20 mEq Oral  [facility] V, ERIK      senna  2 tablet Oral HS Glen Calvo MD      sertraline  25 mg Oral Daily [facility] VERIK      [START ON 9/7/2021] warfarin  2 5 mg Oral Daily (warfarin) Garry Pace PA-C      warfarin  5 mg Oral Once (warfarin) Garry Pace PA-C          Today, Patient Was Seen By: Stevie Lombardi PA-C    **Please Note: This note may have been constructed using a voice recognition system  **

## 2021-09-06 NOTE — CASE MANAGEMENT
[facility] can not accept new referral   Wife aware and no other agency preference  [place] referrals made to other agencies

## 2021-09-06 NOTE — ASSESSMENT & PLAN NOTE
· Possible progression from Alzheimer's disease, history of CVA  · Modified diet per speech dysphagia 2 with honey thick liquids

## 2021-09-06 NOTE — NURSING NOTE
Pt slept during approx half of hrly rounds between q2hr repositioning; wife in room all night and also attends to pt  Urine output qs via external cath  c-pap on overnight as ordered

## 2021-09-06 NOTE — CASE MANAGEMENT
Message received from [facility] that patient will be discharged and needs HHC and a WC  Call to wife Brady Hung on cell and home phones left a VM to return call this CM  to discuss same  Attempted to call wife in patient's room and no answer on hospital phone  Notified Chichi liu

## 2021-09-06 NOTE — ASSESSMENT & PLAN NOTE
· End-stage Alzheimer's  · Wife is 24/7 caregiver  · Optimize sleep-wake cycles  · Maintained on melatonin, sertraline

## 2021-09-07 ENCOUNTER — TELEPHONE (OUTPATIENT)
Dept: VASCULAR SURGERY | Facility: CLINIC | Age: 81
End: 2021-09-07

## 2021-09-07 ENCOUNTER — APPOINTMENT (INPATIENT)
Dept: NON INVASIVE DIAGNOSTICS | Facility: HOSPITAL | Age: 81
DRG: 189 | End: 2021-09-07
Payer: MEDICARE

## 2021-09-07 ENCOUNTER — APPOINTMENT (INPATIENT)
Dept: RADIOLOGY | Facility: HOSPITAL | Age: 81
DRG: 189 | End: 2021-09-07
Attending: INTERNAL MEDICINE
Payer: MEDICARE

## 2021-09-07 PROBLEM — I48.0 PAROXYSMAL ATRIAL FIBRILLATION (HCC): Status: ACTIVE | Noted: 2021-09-03

## 2021-09-07 PROBLEM — G30.1 LATE ONSET ALZHEIMER'S DEMENTIA WITHOUT BEHAVIORAL DISTURBANCE (HCC): Status: ACTIVE | Noted: 2021-09-03

## 2021-09-07 LAB
ANION GAP SERPL CALCULATED.3IONS-SCNC: 5 MMOL/L (ref 4–13)
BASOPHILS # BLD AUTO: 0.05 THOUSANDS/ΜL (ref 0–0.1)
BASOPHILS NFR BLD AUTO: 1 % (ref 0–1)
BUN SERPL-MCNC: 17 MG/DL (ref 5–25)
CALCIUM SERPL-MCNC: 8.5 MG/DL (ref 8.3–10.1)
CHLORIDE SERPL-SCNC: 99 MMOL/L (ref 100–108)
CO2 SERPL-SCNC: 33 MMOL/L (ref 21–32)
CREAT SERPL-MCNC: 1.09 MG/DL (ref 0.6–1.3)
EOSINOPHIL # BLD AUTO: 0.13 THOUSAND/ΜL (ref 0–0.61)
EOSINOPHIL NFR BLD AUTO: 2 % (ref 0–6)
ERYTHROCYTE [DISTWIDTH] IN BLOOD BY AUTOMATED COUNT: 12.9 % (ref 11.6–15.1)
GFR SERPL CREATININE-BSD FRML MDRD: 64 ML/MIN/1.73SQ M
GLUCOSE SERPL-MCNC: 98 MG/DL (ref 65–140)
HCT VFR BLD AUTO: 47.7 % (ref 36.5–49.3)
HGB BLD-MCNC: 15.6 G/DL (ref 12–17)
IMM GRANULOCYTES # BLD AUTO: 0.06 THOUSAND/UL (ref 0–0.2)
IMM GRANULOCYTES NFR BLD AUTO: 1 % (ref 0–2)
INR PPP: 1.94 (ref 0.84–1.19)
LYMPHOCYTES # BLD AUTO: 0.88 THOUSANDS/ΜL (ref 0.6–4.47)
LYMPHOCYTES NFR BLD AUTO: 11 % (ref 14–44)
MCH RBC QN AUTO: 33.1 PG (ref 26.8–34.3)
MCHC RBC AUTO-ENTMCNC: 32.7 G/DL (ref 31.4–37.4)
MCV RBC AUTO: 101 FL (ref 82–98)
MONOCYTES # BLD AUTO: 1.42 THOUSAND/ΜL (ref 0.17–1.22)
MONOCYTES NFR BLD AUTO: 18 % (ref 4–12)
NEUTROPHILS # BLD AUTO: 5.42 THOUSANDS/ΜL (ref 1.85–7.62)
NEUTS SEG NFR BLD AUTO: 67 % (ref 43–75)
NRBC BLD AUTO-RTO: 0 /100 WBCS
PLATELET # BLD AUTO: 178 THOUSANDS/UL (ref 149–390)
PMV BLD AUTO: 8.9 FL (ref 8.9–12.7)
POTASSIUM SERPL-SCNC: 4.6 MMOL/L (ref 3.5–5.3)
PROTHROMBIN TIME: 22 SECONDS (ref 11.6–14.5)
RBC # BLD AUTO: 4.72 MILLION/UL (ref 3.88–5.62)
SODIUM SERPL-SCNC: 137 MMOL/L (ref 136–145)
VALPROATE SERPL-MCNC: 66 UG/ML (ref 50–100)
WBC # BLD AUTO: 7.96 THOUSAND/UL (ref 4.31–10.16)

## 2021-09-07 PROCEDURE — 71045 X-RAY EXAM CHEST 1 VIEW: CPT

## 2021-09-07 PROCEDURE — 80164 ASSAY DIPROPYLACETIC ACD TOT: CPT | Performed by: INTERNAL MEDICINE

## 2021-09-07 PROCEDURE — 93306 TTE W/DOPPLER COMPLETE: CPT

## 2021-09-07 PROCEDURE — 85610 PROTHROMBIN TIME: CPT | Performed by: INTERNAL MEDICINE

## 2021-09-07 PROCEDURE — 93306 TTE W/DOPPLER COMPLETE: CPT | Performed by: INTERNAL MEDICINE

## 2021-09-07 PROCEDURE — 99232 SBSQ HOSP IP/OBS MODERATE 35: CPT | Performed by: INTERNAL MEDICINE

## 2021-09-07 PROCEDURE — 80048 BASIC METABOLIC PNL TOTAL CA: CPT | Performed by: INTERNAL MEDICINE

## 2021-09-07 PROCEDURE — 85025 COMPLETE CBC W/AUTO DIFF WBC: CPT | Performed by: INTERNAL MEDICINE

## 2021-09-07 RX ORDER — LANOLIN ALCOHOL/MO/W.PET/CERES
6 CREAM (GRAM) TOPICAL
Status: DISCONTINUED | OUTPATIENT
Start: 2021-09-07 | End: 2021-09-09 | Stop reason: HOSPADM

## 2021-09-07 RX ORDER — WARFARIN SODIUM 2.5 MG/1
2.5 TABLET ORAL
Status: COMPLETED | OUTPATIENT
Start: 2021-09-07 | End: 2021-09-07

## 2021-09-07 RX ADMIN — LEVOTHYROXINE SODIUM 50 MCG: 25 TABLET ORAL at 07:40

## 2021-09-07 RX ADMIN — EZETIMIBE 10 MG: 10 TABLET ORAL at 07:41

## 2021-09-07 RX ADMIN — ATENOLOL 25 MG: 25 TABLET ORAL at 07:40

## 2021-09-07 RX ADMIN — WARFARIN SODIUM 2.5 MG: 2.5 TABLET ORAL at 17:16

## 2021-09-07 RX ADMIN — SERTRALINE HYDROCHLORIDE 25 MG: 25 TABLET ORAL at 07:40

## 2021-09-07 RX ADMIN — DIVALPROEX SODIUM 250 MG: 250 TABLET, DELAYED RELEASE ORAL at 07:40

## 2021-09-07 RX ADMIN — CEFTRIAXONE 1000 MG: 1 INJECTION, SOLUTION INTRAVENOUS at 17:27

## 2021-09-07 RX ADMIN — SENNOSIDES 17.2 MG: 8.6 TABLET ORAL at 21:57

## 2021-09-07 RX ADMIN — DOCUSATE SODIUM 100 MG: 100 CAPSULE, LIQUID FILLED ORAL at 17:13

## 2021-09-07 RX ADMIN — DIVALPROEX SODIUM 500 MG: 250 TABLET, DELAYED RELEASE ORAL at 21:57

## 2021-09-07 RX ADMIN — DRONEDARONE 400 MG: 400 TABLET, FILM COATED ORAL at 07:48

## 2021-09-07 RX ADMIN — DRONEDARONE 400 MG: 400 TABLET, FILM COATED ORAL at 17:13

## 2021-09-07 RX ADMIN — GUAIFENESIN 600 MG: 600 TABLET ORAL at 17:13

## 2021-09-07 RX ADMIN — POTASSIUM CHLORIDE 20 MEQ: 1500 TABLET, EXTENDED RELEASE ORAL at 07:40

## 2021-09-07 RX ADMIN — DOCUSATE SODIUM 100 MG: 100 CAPSULE, LIQUID FILLED ORAL at 07:41

## 2021-09-07 RX ADMIN — Medication 6 MG: at 21:57

## 2021-09-07 RX ADMIN — ACETAMINOPHEN 650 MG: 325 TABLET, FILM COATED ORAL at 21:57

## 2021-09-07 RX ADMIN — GUAIFENESIN 600 MG: 600 TABLET ORAL at 07:40

## 2021-09-07 NOTE — CASE MANAGEMENT
Met with pt's wife and family to discuss dc plan  Pt's wife states the [facility] Ascencio order was cancelled because her son was able to receive a WC  Pt's wife is requesting a hospital bed  [place] of choice discussed  She is requesting a referral to [facility]  Referral sent via [facility] accepted  CM added CARMELO C to pt's dc instructions

## 2021-09-07 NOTE — ASSESSMENT & PLAN NOTE
Patient has very severe Alzheimer's dementia without behaviors  Family reported that his dementia is worse and he is not as responsive as before  CT head showed no CVA or bleed  So far we have not been able to find infectious source    Urinalysis showed no UTI, COVID came back negative and blood cultures showed no growth  He has no coughing or choking on mechanical soft diet with nectar thick liquids    Patient is more awake and responsive this morning than yesterday  Will repeat chest x-ray to see patient developed a new infiltrates suggestive of pneumonia    I discussed the case with patient's daughter at bedside in detail on September 7th

## 2021-09-07 NOTE — TELEPHONE ENCOUNTER
----- Message from Thaddeus Bach MD sent at 9/3/2021  7:03 PM EDT -----  Please arrange for outaptient followup after hospital discharge  Non urgent at [facility]  Any available VS or AP  Dx- AAA    Thanks

## 2021-09-07 NOTE — ASSESSMENT & PLAN NOTE
Patient has AAA seen on CT:3 2 2 cm x 3 4 cm saccular infrarenal abdominal aortic aneurysm with focal dissection flap  Vascular surgery suggested continuous follow-up    I discussed the case with patient's wife and daughter at yesterday at the bedside:  Patient would not be an operative candidate due to his advanced dementia

## 2021-09-07 NOTE — TELEPHONE ENCOUNTER
Patient still in house, OV scheduled for 9/14/21 with Dr Theresa Miranda in [facility]

## 2021-09-07 NOTE — PROGRESS NOTES
[place]  Progress Note - Scotty Hoskins 1940, [de-identified] y o  male MRN: [id number]  Unit/Bed#: -01 Encounter: [id number]  Primary Care Provider: Mary Reid MD   Date and time admitted to hospital: 9/3/2021 11:30 AM    * Acute respiratory failure with hypoxia [facility])  Assessment & Plan  Patient admitted with shortness of breath, respiratory failure with respiratory rate 24, hypoxia requiring oxygen via nasal cannula 4 liters/minute  CT chest revealed atelectasis  Patient currently is on room air with oxygen saturations of 90-92%    He had a temperature 101° last night, I will repeat chest x-ray to see if any new infiltrates developed    Patient has no evidence of acute CHF today    Hold Lasix    Alzheimer's dementia 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  Family reported that his dementia is worse and he is not as responsive as before  CT head showed no CVA or bleed  So far we have not been able to find infectious source  Urinalysis showed no UTI, COVID came back negative and blood cultures showed no growth  He has no coughing or choking on mechanical soft diet with nectar thick liquids    Patient is more awake and responsive this morning than yesterday  Will repeat chest x-ray to see patient developed a new infiltrates suggestive of pneumonia    I discussed the case with patient's daughter at bedside in detail on September 7th      Paroxysmal atrial fibrillation [facility])  Assessment & Plan  Patient has history of PAF  He status post pacemaker insertion  Heart rhythm is regular  I decreased the dose of atenolol because of hypotension yesterday      Blood pressure is stable today:  370 systolic this morning    Continue atenolol 25 mg daily    INR is pending        AAA (abdominal aortic aneurysm) ([facility] [address] )  Assessment & Plan  Patient has AAA seen on CT:3 2 2 cm x 3 4 cm saccular infrarenal abdominal aortic aneurysm with focal dissection flap  Vascular surgery suggested continuous follow-up  I discussed the case with patient's wife and daughter at yesterday at the bedside:  Patient would not be an operative candidate due to his advanced dementia        VTE Prophylaxis: in place    Patient Centered Rounds: I rounded with patient's nurse    Current Length of Stay: 4 day(s)    Current Patient Status: Inpatient    Certification Statement: Pt requires additional inpatient hospital stay due to: see assessment and plan        Subjective:   Patient's daughter reports that patient ate and had no coughing or choking this morning  Patient's nurse reports that patient has had no diarrhea, no seizures  Patient does not answer my questions but he is more awake and follows commands    He had a fever 101 1 last night  Afebrile today    All other ROS are negative    Objective:     Vitals:   Temp (24hrs), Av 2 °F (37 3 °C), Min:98 1 °F (36 7 °C), Max:101 1 °F (38 4 °C)    Temp:  [98 1 °F (36 7 °C)-101 1 °F (38 4 °C)] 98 1 °F (36 7 °C)  HR:  [53-88] 88  Resp:  [9-18] 18  BP: ()/(61-74) 119/67  SpO2:  [87 %-93 %] 92 %  Body mass index is 27 01 kg/m²  Input and Output Summary (last 24 hours): Intake/Output Summary (Last 24 hours) at 2021 1055  Last data filed at 2021 1000  Gross per 24 hour   Intake 410 ml   Output 1066 ml   Net -656 ml       Physical Exam:     Physical Exam  Constitutional:       Comments: Patient is much more awake alert today than he was yesterday  He lies in bed in no distress with left eye open  Right eye is in enaculated  He is in no distress   HENT:      Head: Normocephalic  Cardiovascular:      Rate and Rhythm: Normal rate and regular rhythm  Heart sounds: No murmur heard  Pulmonary:      Effort: No respiratory distress  Breath sounds: No wheezing or rales  Abdominal:      General: Bowel sounds are normal  There is no distension  Palpations: Abdomen is soft        Tenderness: There is no guarding  Skin:     General: Skin is warm and dry  Comments: He had no lower extremity edema, I saw no cellulitis   Neurological:      Mental Status: Mental status is at baseline  Comments: He had no facial droop, he squeeze with both hands on command and moves both feet on command             I personally reviewed labs and imaging reports for today  Last 24 Hours Medication List:   Current Facility-Administered Medications   Medication Dose Route Frequency Provider Last Rate    acetaminophen  650 mg Oral Q6H PRN Harmony Aldrich PA-C      albuterol  2 5 mg Nebulization Q4H PRN Liset Ascencio MD      atenolol  25 mg Oral Daily Chas Daniels MD      bisacodyl  10 mg Rectal Daily PRN Liset Ascencio MD      cefTRIAXone  1,000 mg Intravenous Q24H Chichi Downs V, ERIK 1,000 mg (09/06/21 1721)    divalproex sodium  250 mg Oral QAM Chichi Downs V, ERIK      divalproex sodium  500 mg Oral HS Chichi Downs V, ERIK      docusate sodium  100 mg Oral BID Liset Ascencio MD      dronedarone  400 mg Oral BID [facility] [address] [facility] V, ERIK      ezetimibe  10 mg Oral Daily [address] V, ERKI      guaiFENesin  600 mg Oral BID Chichi Downs V, ERIK      levothyroxine  50 mcg Oral Daily Chichi Downs V, PA-C      polyethylene glycol  17 g Oral Daily PRN Liset Ascencio MD      potassium chloride  20 mEq Oral  [address] V, ERIK      senna  2 tablet Oral HS Liset Ascencio MD      sertraline  25 mg Oral Daily [address] V, ERIK      warfarin  2 5 mg Oral Daily (warfarin) Mitch Vega PA-C            Today, Patient Was Seen By: Chas Daniels MD    ** Please Note: Dictation voice to text software may have been used in the creation of this document   **

## 2021-09-07 NOTE — NURSING NOTE
Pt slept during most hrly rounds overnight between q2hr repositioning; wife at bedside throughout  Pt incont qs; wife refusing condom cath  For pt

## 2021-09-07 NOTE — ASSESSMENT & PLAN NOTE
Patient admitted with shortness of breath, respiratory failure with respiratory rate 24, hypoxia requiring oxygen via nasal cannula 4 liters/minute  CT chest revealed atelectasis      Patient currently is on room air with oxygen saturations of 90-92%    He had a temperature 101° last night, I will repeat chest x-ray to see if any new infiltrates developed    Patient has no evidence of acute CHF today    Hold Lasix

## 2021-09-07 NOTE — ASSESSMENT & PLAN NOTE
Patient has history of PAF  He status post pacemaker insertion  Heart rhythm is regular  I decreased the dose of atenolol because of hypotension yesterday      Blood pressure is stable today:  408 systolic this morning    Continue atenolol 25 mg daily    INR is pending

## 2021-09-08 VITALS
HEART RATE: 80 BPM | OXYGEN SATURATION: 91 % | WEIGHT: 168.65 LBS | RESPIRATION RATE: 16 BRPM | BODY MASS INDEX: 27.1 KG/M2 | SYSTOLIC BLOOD PRESSURE: 108 MMHG | TEMPERATURE: 98.2 F | HEIGHT: 66 IN | DIASTOLIC BLOOD PRESSURE: 61 MMHG

## 2021-09-08 PROBLEM — I50.32 CHRONIC DIASTOLIC CONGESTIVE HEART FAILURE (HCC): Status: ACTIVE | Noted: 2021-09-08

## 2021-09-08 LAB
BACTERIA BLD CULT: NORMAL
BACTERIA BLD CULT: NORMAL
BASOPHILS # BLD AUTO: 0.06 THOUSANDS/ΜL (ref 0–0.1)
BASOPHILS NFR BLD AUTO: 1 % (ref 0–1)
DME PARACHUTE DELIVERY DATE ACTUAL: NORMAL
DME PARACHUTE DELIVERY DATE EXPECTED: NORMAL
DME PARACHUTE DELIVERY DATE REQUESTED: NORMAL
DME PARACHUTE ITEM DESCRIPTION: NORMAL
DME PARACHUTE ORDER STATUS: NORMAL
DME PARACHUTE SUPPLIER NAME: NORMAL
DME PARACHUTE SUPPLIER PHONE: NORMAL
EOSINOPHIL # BLD AUTO: 0.23 THOUSAND/ΜL (ref 0–0.61)
EOSINOPHIL NFR BLD AUTO: 3 % (ref 0–6)
ERYTHROCYTE [DISTWIDTH] IN BLOOD BY AUTOMATED COUNT: 12.9 % (ref 11.6–15.1)
HCT VFR BLD AUTO: 44.3 % (ref 36.5–49.3)
HGB BLD-MCNC: 14.3 G/DL (ref 12–17)
IMM GRANULOCYTES # BLD AUTO: 0.11 THOUSAND/UL (ref 0–0.2)
IMM GRANULOCYTES NFR BLD AUTO: 2 % (ref 0–2)
INR PPP: 2.26 (ref 0.84–1.19)
LYMPHOCYTES # BLD AUTO: 1.36 THOUSANDS/ΜL (ref 0.6–4.47)
LYMPHOCYTES NFR BLD AUTO: 19 % (ref 14–44)
MCH RBC QN AUTO: 32.8 PG (ref 26.8–34.3)
MCHC RBC AUTO-ENTMCNC: 32.3 G/DL (ref 31.4–37.4)
MCV RBC AUTO: 102 FL (ref 82–98)
MONOCYTES # BLD AUTO: 1.19 THOUSAND/ΜL (ref 0.17–1.22)
MONOCYTES NFR BLD AUTO: 16 % (ref 4–12)
NEUTROPHILS # BLD AUTO: 4.35 THOUSANDS/ΜL (ref 1.85–7.62)
NEUTS SEG NFR BLD AUTO: 59 % (ref 43–75)
NRBC BLD AUTO-RTO: 0 /100 WBCS
PLATELET # BLD AUTO: 170 THOUSANDS/UL (ref 149–390)
PMV BLD AUTO: 8.7 FL (ref 8.9–12.7)
PROTHROMBIN TIME: 24.4 SECONDS (ref 11.6–14.5)
RBC # BLD AUTO: 4.36 MILLION/UL (ref 3.88–5.62)
WBC # BLD AUTO: 7.3 THOUSAND/UL (ref 4.31–10.16)

## 2021-09-08 PROCEDURE — 85610 PROTHROMBIN TIME: CPT | Performed by: INTERNAL MEDICINE

## 2021-09-08 PROCEDURE — 85025 COMPLETE CBC W/AUTO DIFF WBC: CPT | Performed by: INTERNAL MEDICINE

## 2021-09-08 PROCEDURE — 99239 HOSP IP/OBS DSCHRG MGMT >30: CPT | Performed by: INTERNAL MEDICINE

## 2021-09-08 PROCEDURE — 92526 ORAL FUNCTION THERAPY: CPT

## 2021-09-08 RX ORDER — FUROSEMIDE 20 MG/1
20 TABLET ORAL EVERY MORNING
Qty: 30 TABLET | Refills: 0 | Status: SHIPPED | OUTPATIENT
Start: 2021-09-08

## 2021-09-08 RX ORDER — ATENOLOL 25 MG/1
25 TABLET ORAL DAILY
Qty: 30 TABLET | Refills: 0 | Status: SHIPPED | OUTPATIENT
Start: 2021-09-09

## 2021-09-08 RX ADMIN — DOCUSATE SODIUM 100 MG: 100 CAPSULE, LIQUID FILLED ORAL at 08:00

## 2021-09-08 RX ADMIN — DIVALPROEX SODIUM 250 MG: 250 TABLET, DELAYED RELEASE ORAL at 08:00

## 2021-09-08 RX ADMIN — SERTRALINE HYDROCHLORIDE 25 MG: 25 TABLET ORAL at 08:00

## 2021-09-08 RX ADMIN — DRONEDARONE 400 MG: 400 TABLET, FILM COATED ORAL at 07:50

## 2021-09-08 RX ADMIN — CEFTRIAXONE 1000 MG: 1 INJECTION, SOLUTION INTRAVENOUS at 17:33

## 2021-09-08 RX ADMIN — EZETIMIBE 10 MG: 10 TABLET ORAL at 08:00

## 2021-09-08 RX ADMIN — DOCUSATE SODIUM 100 MG: 100 CAPSULE, LIQUID FILLED ORAL at 17:32

## 2021-09-08 RX ADMIN — LEVOTHYROXINE SODIUM 50 MCG: 25 TABLET ORAL at 08:00

## 2021-09-08 RX ADMIN — GUAIFENESIN 600 MG: 600 TABLET ORAL at 08:00

## 2021-09-08 RX ADMIN — GUAIFENESIN 600 MG: 600 TABLET ORAL at 17:32

## 2021-09-08 RX ADMIN — DRONEDARONE 400 MG: 400 TABLET, FILM COATED ORAL at 17:33

## 2021-09-08 NOTE — ASSESSMENT & PLAN NOTE
Patient has AAA seen on CT:3 2 2 cm x 3 4 cm saccular infrarenal abdominal aortic aneurysm with focal dissection flap  Vascular surgery recommended no surgery but outpatient follow-up  I discussed the case with patient's wife at the bedside:  Patient would not be an operative candidate due to his advanced dementia      His abdomen soft, nondistended nontender on day of discharge

## 2021-09-08 NOTE — ASSESSMENT & PLAN NOTE
Patient has history of PAF  He status post pacemaker insertion  Heart rhythm is regular on day of discharge    The dose of atenolol was decreased to 25 mg daily because of hypotension during the hospital stay  INR is 2 26  Patient had no GI or  bleeding during the hospital stay    I made no changes to his warfarin dosing  Ask him to follow-up with his cardiologist within 2 weeks

## 2021-09-08 NOTE — NURSING NOTE
Pt slept during most hrly rounds overnight between q2hr repositioning; restless in his sleep at times  Wife in room throughout night

## 2021-09-08 NOTE — ASSESSMENT & PLAN NOTE
Patient has dysphagia  He was seen by speech and mechanical soft diet with nectar thick liquids were recommended  Patient tolerated dysphagia diet without coughing or choking    He was felt today by patient's wife

## 2021-09-08 NOTE — CASE MANAGEMENT
CM was informed that pt is a potential dc for [facility]  Hospital bed approved pending delivery  CM spoke to [facility] via "[employer] and requested delivery for today  Met with pt's wife to update her of same  Pt's wife reports pt had a fever overnight  Dr Cottrell Files aware of same  CM informed pt's wife that [facility] accepted; she is agreeable  Pt's wife states her and her children will transport pt home when medically stable  IMM reviewed with patient's caregiver, patient's caregiver agrees with discharge determination

## 2021-09-08 NOTE — ASSESSMENT & PLAN NOTE
Wt Readings from Last 3 Encounters:   09/08/21 76 5 kg (168 lb 10 4 oz)         Patient has chronic diastolic CHF  Echo showed ejection fraction of 60%, LVH and LV diastolic dysfunction  He was treated with IV Lasix during the hospital stay because he had lower extremity edema  CT chest and chest x-ray showed no CHF though  Patient has no lower extremity edema on day of discharge  I am discharging patient on Lasix 20 mg in the morning and Lasix will replace chlorthalidone    I explained this to wife thoroughly

## 2021-09-08 NOTE — ASSESSMENT & PLAN NOTE
Patient has very severe Alzheimer's dementia without behaviors  Family reported that his dementia was worse and he was not as responsive as before  CT head showed no CVA or bleed  We have not been able to find infectious or metabolic source  Patient opens left eye on day of discharge and when wife asked him whether he wanted to be discharged home he answered yes  He moved both hands on command  This may be patient's new baseline of dementia    I asked patient's wife to arrange appointment with PCP in a week

## 2021-09-08 NOTE — DISCHARGE SUMMARY
[place]  Discharge- [facility] 1940, [de-identified] y o  male MRN: [id number]  Unit/Bed#: -01 Encounter: [id number]  Primary Care Provider: Stacy Penny MD   Date and time admitted to hospital: 9/3/2021 11:30 AM    * Acute respiratory failure with hypoxia [facility])  Assessment & Plan  Patient admitted with shortness of breath, respiratory failure with respiratory rate 24, hypoxia requiring oxygen via nasal cannula 4 liters/minute  CT chest and chest x-ray revealed atelectasis  Hypoxia resolved and patient's oxygen saturation on room air on day of discharge is 91-93%  He had low-grade temperature of 100 3° last night likely due to atelectasis  Blood cultures showed no growth, urinalysis showed no UTI and repeat chest x-ray study only showed atelectasis  Patient has no medical stable condition for discharge  I discussed the case with patient's wife at the bedside in detail on September 8th and explained test results and medication changes to her in detail  Late onset Alzheimer's dementia without behavioral disturbance [facility])  Assessment & Plan  Patient has very severe Alzheimer's dementia without behaviors  Family reported that his dementia was worse and he was not as responsive as before  CT head showed no CVA or bleed  We have not been able to find infectious or metabolic source  Patient opens left eye on day of discharge and when wife asked him whether he wanted to be discharged home he answered yes  He moved both hands on command  This may be patient's new baseline of dementia  I asked patient's wife to arrange appointment with PCP in a week      Paroxysmal atrial fibrillation [facility])  Assessment & Plan  Patient has history of PAF  He status post pacemaker insertion  Heart rhythm is regular on day of discharge    The dose of atenolol was decreased to 25 mg daily because of hypotension during the hospital stay  INR is 2 26    Patient had no GI or  bleeding during the hospital stay  I made no changes to his warfarin dosing  Ask him to follow-up with his cardiologist within 2 weeks        Chronic diastolic congestive heart failure ([address] )  Assessment & Plan  Wt Readings from Last 3 Encounters:   09/08/21 76 5 kg (168 lb 10 4 oz)         Patient has chronic diastolic CHF  Echo showed ejection fraction of 60%, LVH and LV diastolic dysfunction  He was treated with IV Lasix during the hospital stay because he had lower extremity edema  CT chest and chest x-ray showed no CHF though  Patient has no lower extremity edema on day of discharge  I am discharging patient on Lasix 20 mg in the morning and Lasix will replace chlorthalidone  I explained this to wife thoroughly    Abdominal aortic aneurysm (AAA) without rupture [facility])  Assessment & Plan  Patient has AAA seen on CT:3 2 2 cm x 3 4 cm saccular infrarenal abdominal aortic aneurysm with focal dissection flap  Vascular surgery recommended no surgery but outpatient follow-up  I discussed the case with patient's wife at the bedside:  Patient would not be an operative candidate due to his advanced dementia  His abdomen soft, nondistended nontender on day of discharge      Dysphagia  Assessment & Plan  Patient has dysphagia  He was seen by speech and mechanical soft diet with nectar thick liquids were recommended  Patient tolerated dysphagia diet without coughing or choking  He was felt today by patient's wife            Hospital Course:     Darline Yan is a [de-identified] y o  male patient who originally presented to the hospital on   Admission Orders (From admission, onward)     Ordered        09/03/21 [address]                  due to hypoxia    Please see above list of diagnoses and related plan for additional information  Condition at Discharge:  good      Discharge instructions/Information to patient and family:   See after visit summary for information provided to patient and family  Provisions for Follow-Up Care:  See after visit summary for information related to follow-up care and any pertinent home health orders  Disposition:     Home with VNA Services (Reminder: Complete face to face encounter)       Discharge Statement:  I spent 35 minutes discharging the patient  This time was spent on the day of discharge  I had direct contact with the patient on the day of discharge  Greater than 50% of the total time was spent examining patient, answering all patient questions, arranging and discussing plan of care with patient as well as directly providing post-discharge instructions  Additional time then spent on discharge activities  Discharge Medications:  See after visit summary for reconciled discharge medications provided to patient and family        ** Please Note: This note has been constructed using a voice recognition system **

## 2021-09-08 NOTE — SPEECH THERAPY NOTE
Speech Language/Pathology    Speech/Language Pathology Progress Note    Patient Name: Karlo Patel  Today's Date: 9/8/2021     Problem List  Principal Problem:    Acute respiratory failure with hypoxia ([facility] [address] )  Active Problems:    Paroxysmal atrial fibrillation (HCC)    Hx of CABG    Late onset Alzheimer's dementia without behavioral disturbance ([facility] [address] )    Abdominal aortic aneurysm (AAA) without rupture (HCC)    Hypothyroidism    Hyperlipidemia    Constipation    Dysphagia    Chronic diastolic congestive heart failure ([facility] [address] )       Past Medical History  Past Medical History:   Diagnosis Date    Alzheimer's dementia ([facility] [address] )     Atrial fibrillation, chronic ([facility] [address] )         Past Surgical History  Past Surgical History:   Procedure Laterality Date    APPENDECTOMY      CARDIAC PACEMAKER PLACEMENT      CORONARY ARTERY BYPASS GRAFT      HERNIA REPAIR           Subjective:  Pt seen for dysphagia tx  Pt sitting upright in bed, wife at bedside  Pt mostly nonverbal      Objective:  Reviewed chart, discussed diet with pt's wife  Plan was to trial thin liquids, however she would like pt to remain on NTL, as she feels he is tolerating them much better than thin liquids, with no coughing  Her son has already ordered thickener packets and they are at their house; pt likely to be discharged today  Reviewed how to thicken liquids with wife, gave verbal instructions and demonstration  She verbalized understanding  Pt took a few very small sips but then refused further  He tolerated well, with no s/s of aspiration  Pt recently finished lunch, so pt's wife declined a snack for him  She has been preparing mech soft foods at home for pt, and monitors his eating very closely, often feeding him to ensure safe swallowing  Reviewed aspiration precautions with wife; she verbalized understanding, and will notify physician if pt has any changes in his swallowing at home, as dementia progresses       Assessment:  Pt's wife feels pt is tolerating mech soft diet (which he was on at home) and NTL well; she prefers he remain on NTL since he is tolerating it better than thins  No s/s of aspiration with a few small sips of NTL by cup  Plan/Recommendations:  Continue dysphagia 2/mech soft diet and NTL  Aspiration precautions  If pt still here, ST will follow up preferably with a meal, however he will likely be discharged home later today